# Patient Record
Sex: FEMALE | Race: WHITE | NOT HISPANIC OR LATINO | Employment: OTHER | URBAN - METROPOLITAN AREA
[De-identification: names, ages, dates, MRNs, and addresses within clinical notes are randomized per-mention and may not be internally consistent; named-entity substitution may affect disease eponyms.]

---

## 2019-01-09 ENCOUNTER — TRANSCRIBE ORDERS (OUTPATIENT)
Dept: ADMINISTRATIVE | Facility: HOSPITAL | Age: 64
End: 2019-01-09

## 2019-01-09 DIAGNOSIS — Z12.39 SCREENING BREAST EXAMINATION: Primary | ICD-10-CM

## 2019-02-04 ENCOUNTER — HOSPITAL ENCOUNTER (OUTPATIENT)
Dept: RADIOLOGY | Age: 64
Discharge: HOME | End: 2019-02-04
Payer: COMMERCIAL

## 2019-02-04 VITALS — BODY MASS INDEX: 25.71 KG/M2 | HEIGHT: 66 IN | WEIGHT: 160 LBS

## 2019-02-04 DIAGNOSIS — Z12.39 SCREENING BREAST EXAMINATION: ICD-10-CM

## 2019-02-04 PROCEDURE — 77067 SCR MAMMO BI INCL CAD: CPT

## 2019-08-12 ENCOUNTER — OFFICE VISIT (OUTPATIENT)
Dept: OBGYN CLINIC | Facility: HOSPITAL | Age: 64
End: 2019-08-12
Payer: COMMERCIAL

## 2019-08-12 VITALS
HEIGHT: 66 IN | WEIGHT: 166.6 LBS | BODY MASS INDEX: 26.78 KG/M2 | DIASTOLIC BLOOD PRESSURE: 71 MMHG | SYSTOLIC BLOOD PRESSURE: 111 MMHG | HEART RATE: 67 BPM

## 2019-08-12 DIAGNOSIS — S46.812A INFRASPINATUS TENDON TEAR, LEFT, INITIAL ENCOUNTER: ICD-10-CM

## 2019-08-12 DIAGNOSIS — M75.102 TEAR OF LEFT SUPRASPINATUS TENDON: Primary | ICD-10-CM

## 2019-08-12 PROCEDURE — 20610 DRAIN/INJ JOINT/BURSA W/O US: CPT | Performed by: ORTHOPAEDIC SURGERY

## 2019-08-12 PROCEDURE — 99203 OFFICE O/P NEW LOW 30 MIN: CPT | Performed by: ORTHOPAEDIC SURGERY

## 2019-08-12 RX ORDER — BUPIVACAINE HYDROCHLORIDE 2.5 MG/ML
2 INJECTION, SOLUTION INFILTRATION; PERINEURAL
Status: COMPLETED | OUTPATIENT
Start: 2019-08-12 | End: 2019-08-12

## 2019-08-12 RX ORDER — BETAMETHASONE SODIUM PHOSPHATE AND BETAMETHASONE ACETATE 3; 3 MG/ML; MG/ML
6 INJECTION, SUSPENSION INTRA-ARTICULAR; INTRALESIONAL; INTRAMUSCULAR; SOFT TISSUE
Status: COMPLETED | OUTPATIENT
Start: 2019-08-12 | End: 2019-08-12

## 2019-08-12 RX ORDER — PINDOLOL 5 MG/1
TABLET ORAL
COMMUNITY
Start: 2019-07-08

## 2019-08-12 RX ORDER — ATORVASTATIN CALCIUM 10 MG/1
TABLET, FILM COATED ORAL
COMMUNITY
Start: 2019-07-08

## 2019-08-12 RX ADMIN — BETAMETHASONE SODIUM PHOSPHATE AND BETAMETHASONE ACETATE 6 MG: 3; 3 INJECTION, SUSPENSION INTRA-ARTICULAR; INTRALESIONAL; INTRAMUSCULAR; SOFT TISSUE at 10:09

## 2019-08-12 RX ADMIN — BUPIVACAINE HYDROCHLORIDE 2 ML: 2.5 INJECTION, SOLUTION INFILTRATION; PERINEURAL at 10:09

## 2019-08-12 NOTE — PROGRESS NOTES
Assessment  Diagnoses and all orders for this visit:    Supraspinatus tendon, left  Comments:  retracted    Infraspinatus tendon tear, left  Comments:  chronic, retracted        Discussion and Plan:    · Explained to the patient that her MRI reveals chronic, retracted tears of the supraspinatus, infraspinatus and subscapularis with fatty muscular atrophy  Patient has had no prior treatment besides home exercise program for her left shoulder  Patient was provided today with a cortisone injection into her subacromial bursa and is documented below  She will also begin formal physical therapy for strengthening exercises due to rotator cuff deficiency  · If conservative treatments failed to improve patient's symptoms and she has continued pain that affects her daily living the ultimate treatment for her diagnosis is to perform a reverse total shoulder arthroplasty but that would only be offered if she had significant pain in addition to her limited function which she does not currently present with     · She will follow up in about 2 months for re-evaluation of left shoulder  Subjective:   Patient ID: Donny Phillips is a 58 y o  female      The patient presents with a chief complaint of left shoulder pain  The pain began 1 year(s) ago and is associated with an acute injury  Patient reports that she fell on an outstretched left arm about 1 year ago and noted immediate pain but symptoms improved until about 1 month ago when she was feeding horse and the horse quickly turned and threw her about 4 feet landing on her left side  The patient describes the pain as aching and dull in intensity,  intermittent in timing, patient reports more dysfunction of the shoulder rather than pain, and localizes the pain to the  left subacromial joint, glenohumeral joint  The pain is worse with overuse and raising arm over head and relieved by rest, ice, avoiding the painful activities    The pain is not associated with numbness and tingling  The pain is not associated with constitutional symptoms  The patient is not awoken at night by the pain  The patient has had prior treatment within last month in the form of a home exercise program with minimal benefit  The following portions of the patient's history were reviewed and updated as appropriate: allergies, current medications, past family history, past medical history, past social history, past surgical history and problem list     Review of Systems   Constitutional: Negative for chills, fever and unexpected weight change  HENT: Negative for hearing loss, nosebleeds and sore throat  Eyes: Negative for pain, redness and visual disturbance  Respiratory: Negative for cough, shortness of breath and wheezing  Cardiovascular: Negative for chest pain, palpitations and leg swelling  Gastrointestinal: Negative for abdominal distention, nausea and vomiting  Endocrine: Negative for polydipsia and polyuria  Genitourinary: Negative for dysuria and hematuria  Skin: Negative for rash and wound  Neurological: Negative for dizziness, numbness and headaches  Psychiatric/Behavioral: Negative for decreased concentration and suicidal ideas  Objective:  Left Shoulder Exam     Tenderness   The patient is experiencing no tenderness  Range of Motion   External rotation: 70   Forward flexion: 170   Internal rotation 0 degrees: Lumbar     Muscle Strength   Abduction: 4/5   External rotation: 4/5     Tests   Impingement: positive  Drop arm: positive    Other   Erythema: absent  Sensation: normal  Pulse: present               Physical Exam   Constitutional: She is oriented to person, place, and time  She appears well-developed and well-nourished  Eyes: Pupils are equal, round, and reactive to light  Pulmonary/Chest: Effort normal and breath sounds normal    Neurological: She is alert and oriented to person, place, and time  Skin: Skin is warm and dry  Psychiatric: She has a normal mood and affect  Her behavior is normal  Judgment and thought content normal      Large joint arthrocentesis: L subacromial bursa  Date/Time: 8/12/2019 10:09 AM  Consent given by: patient  Site marked: site marked  Timeout: Immediately prior to procedure a time out was called to verify the correct patient, procedure, equipment, support staff and site/side marked as required   Supporting Documentation  Indications: pain and diagnostic evaluation   Procedure Details  Location: shoulder - L subacromial bursa  Preparation: Patient was prepped and draped in the usual sterile fashion  Needle size: 22 G  Ultrasound guidance: no  Approach: lateral  Medications administered: 2 mL bupivacaine 0 25 %; 6 mg betamethasone acetate-betamethasone sodium phosphate 6 (3-3) mg/mL    Patient tolerance: patient tolerated the procedure well with no immediate complications  Dressing:  Sterile dressing applied          I have personally reviewed pertinent films in PACS and my interpretation is as follows  MRI Left Shoulder performed on 7/17/19:  Full-thickness, retracted tear of the supraspinatus and infraspinatus tendon with fatty muscle atrophy  Full-thickness tear of the mid subscapularis tendon  Extensive fluid throughout the glenohumeral joint adjacent bursa  Increasing along the proximal biceps tendon suggesting tendinopathy or partial-thickness tearing      Scribe Attestation    I,:   Luisa Valerio am acting as a scribe while in the presence of the attending physician :        I,:   Danae Dean MD personally performed the services described in this documentation    as scribed in my presence :

## 2019-08-12 NOTE — PATIENT INSTRUCTIONS

## 2019-08-22 ENCOUNTER — EVALUATION (OUTPATIENT)
Dept: PHYSICAL THERAPY | Facility: REHABILITATION | Age: 64
End: 2019-08-22
Payer: COMMERCIAL

## 2019-08-22 DIAGNOSIS — S46.812A INFRASPINATUS TENDON TEAR, LEFT, INITIAL ENCOUNTER: ICD-10-CM

## 2019-08-22 DIAGNOSIS — M75.102 TEAR OF LEFT SUPRASPINATUS TENDON: Primary | ICD-10-CM

## 2019-08-22 PROCEDURE — 97110 THERAPEUTIC EXERCISES: CPT | Performed by: PHYSICAL THERAPIST

## 2019-08-22 PROCEDURE — 97161 PT EVAL LOW COMPLEX 20 MIN: CPT | Performed by: PHYSICAL THERAPIST

## 2019-08-22 NOTE — PROGRESS NOTES
PT Evaluation     Today's date: 2019  Patient name: Pa Stanley  : 1955  MRN: [id number]  Referring provider: Saleem Ruiz MD  Dx:   Encounter Diagnosis     ICD-10-CM    1  Tear of left supraspinatus tendon M75 100 Ambulatory referral to Physical Therapy    retracted   2  Infraspinatus tendon tear, left, initial encounter S46 242L Ambulatory referral to Physical Therapy    chronic, retracted                  Assessment  Assessment details: Pt is a 58 y o  Female presenting to PT w/ referring dx of full thickness left infraspinatus tear and associated partial tear of the supraspinatus  Pt impairments include reduced ROM, pain, and reduced physical strength  Pt functional limitations include difficulty pulling, pushing, and lifting objects, difficulty performing overhead motions, and difficulty bearing weight into the left UE  Pt would benefit from skilled PT services to address the below listed impairments and functional limitations to encourage return to PLOF  Impairments: abnormal muscle firing, abnormal or restricted ROM, abnormal movement, impaired physical strength, lacks appropriate home exercise program and pain with function  Functional limitations: Difficulty performing overhead motions, putting arm behind head, lifting objects, pulling objects, bearing weight through affected extremity  Symptom irritability: moderateUnderstanding of Dx/Px/POC: good   Prognosis: good    Goals  STG: Pt will increase left shoulder abd ROM frm 70 degrees to 110 degrees in 2 weeks  STG: Pt will increase left shoulder flexion strength from 4 to 4+ in 2 weeks  STG: Pt will decrease pain from 6 at worst to 4 at worst in 2 weeks  STG: Pt will be I w/ initial HEP in 2 weeks  LTG: Pt will be able to lift and carry 20 lbs in her left hand 30'x1 in 8 weeks to demonstrate ability to complete leisurely tasks     LTG: Pt will be able to 20 lbs from floor to shoulder height 5x in 8 weeks to demonstrate ability to complete leisurely tasks  LTG: Pt will be able to overhead press 5lb x 10 with left arm in 8 weeks to demonstrate ability to complete overhead motion  LTG: Pt will be I w/ HEP at d/c     Plan  Patient would benefit from: PT eval and skilled physical therapy  Planned modality interventions: cryotherapy, electrical stimulation/Russian stimulation, H-Wave, manual electrical stimulation, TENS, microcurrent electrical stimulation, thermotherapy: hydrocollator packs, ultrasound and unattended electrical stimulation  Planned therapy interventions: flexibility, functional ROM exercises, gait training, graded exercise, home exercise program, therapeutic exercise, therapeutic activities, strengthening, stretching, neuromuscular re-education, joint mobilization, manual therapy and massage  Frequency: 2x week  Duration in visits: 20  Duration in weeks: 10  Plan of Care beginning date: 2019  Plan of Care expiration date: 10/31/2019  Treatment plan discussed with: patient        Subjective Evaluation    History of Present Illness  Date of onset: 2019  Mechanism of injury: Pt reports onset of left shoulder pain when a horse ran into her about 1 month ago  She injured her right shoulder as well but it has recovered, her left shoulder is slowly improving  States that she is able to complete motion with her left shoulder if she moves it slowly, but is unable to use it to apply a lot of pressure with, 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  She states that her goals to gain strength back in her left arm so that she is able to effectively work with her horses     Quality of life: good    Pain  Current pain ratin  At best pain ratin  At worst pain ratin  Quality: needle-like and sharp  Relieving factors: ice and heat  Aggravating factors: overhead activity and lifting  Progression: improved      Diagnostic Tests  MRI studies: abnormal    FCE comments: Full thickness infraspinatus tear, retracted, partial supraspinatus tearTreatments  Previous treatment: injection treatment  Patient Goals  Patient goals for therapy: increased motion, decreased pain, increased strength and return to sport/leisure activities          Objective     Cervical/Thoracic Screen   Cervical range of motion within normal limits    Neurological Testing     Sensation     Shoulder   Left Shoulder   Intact: light touch    Right Shoulder   Intact: light touch    Active Range of Motion   Left Shoulder   Flexion: 140 degrees   Abduction: 70 degrees   External rotation 90°: WFL    Right Shoulder   Normal active range of motion    Passive Range of Motion   Left Shoulder   Normal passive range of motion    Right Shoulder   Normal passive range of motion    Strength/Myotome Testing     Left Shoulder     Planes of Motion   Flexion: 4   Extension: 5   Abduction: 3+   External rotation at 0°: 3+   Internal rotation at 0°: 5     Right Shoulder   Normal muscle strength    Left Elbow   Normal strength    Right Elbow   Normal strength    Tests     Left Shoulder   Positive empty can, full can and painful arc  Negative drop arm and Hawkin's  Right Shoulder   Negative drop arm, empty can, full can, Hawkin's and painful arc                Precautions: Supraspinatus tear, full thickness infraspinatus       Manual  8/22                                                                                 Exercise Diary  8/22            Shoulder iso flex/abd/ER 5x10"            Table slide abd x20            Wall W Single arm flex/abd, 2 arm horiz add            Pulleys NV            Cane abd/flex/er NV            Serratus slides on table w/ foam roller NV                                                                                                                                                                                                      Modalities  8/22

## 2019-08-26 ENCOUNTER — OFFICE VISIT (OUTPATIENT)
Dept: PHYSICAL THERAPY | Facility: REHABILITATION | Age: 64
End: 2019-08-26
Payer: COMMERCIAL

## 2019-08-26 DIAGNOSIS — M75.102 TEAR OF LEFT SUPRASPINATUS TENDON: Primary | ICD-10-CM

## 2019-08-26 DIAGNOSIS — S46.812A INFRASPINATUS TENDON TEAR, LEFT, INITIAL ENCOUNTER: ICD-10-CM

## 2019-08-26 PROCEDURE — 97110 THERAPEUTIC EXERCISES: CPT

## 2019-08-26 PROCEDURE — 97112 NEUROMUSCULAR REEDUCATION: CPT

## 2019-08-26 NOTE — PROGRESS NOTES
Daily Note     Today's date: 2019  Patient name: Brenna Gilliam  : 1955  MRN: [id number]  Referring provider: Lexus Tam MD  Dx:   Encounter Diagnosis     ICD-10-CM    1  Tear of left supraspinatus tendon M75 100    2  Infraspinatus tendon tear, left, initial encounter S46 812A        Start Time: 1200  Stop Time: 1250  Total time in clinic (min): 50 minutes    Subjective: Pt reports difficulty with OH motions, "it hurts with weight bearing"  No change since IE  Objective: See treatment diary below      Assessment: Tolerated treatment well  No pain with pulleys this session  Difficulty with ABD isometrics, no increase in pain  Progressed AAROM to good tolerance  Increased flexion AAROM in supine than standing  Patient demonstrated fatigue post treatment      Plan: Continue per plan of care  Assess DOMS response        Precautions: Supraspinatus tear, full thickness infraspinatus       Manual                                                                                  Exercise Diary             Shoulder iso flex/abd/ER 5x10" 10x10"           Table slide abd x20 10x           Wall W Single arm flex/abd, 2 arm horiz add  wall slides flexion and scaption 2x10            Pulleys NV 4'            Cane abd/flex/er NV standing abd 20x  Flex 10x     supine flexion 10x           Serratus slides on table w/ foam roller NV 20x                                                                                                                                                                                                     Modalities

## 2019-08-30 ENCOUNTER — OFFICE VISIT (OUTPATIENT)
Dept: PHYSICAL THERAPY | Facility: REHABILITATION | Age: 64
End: 2019-08-30
Payer: COMMERCIAL

## 2019-08-30 DIAGNOSIS — S46.812A INFRASPINATUS TENDON TEAR, LEFT, INITIAL ENCOUNTER: ICD-10-CM

## 2019-08-30 DIAGNOSIS — M75.102 TEAR OF LEFT SUPRASPINATUS TENDON: Primary | ICD-10-CM

## 2019-08-30 PROCEDURE — 97112 NEUROMUSCULAR REEDUCATION: CPT | Performed by: PHYSICAL THERAPIST

## 2019-08-30 PROCEDURE — 97110 THERAPEUTIC EXERCISES: CPT | Performed by: PHYSICAL THERAPIST

## 2019-08-30 NOTE — PROGRESS NOTES
Daily Note     Today's date: 2019  Patient name: Key Ott  : 1955  MRN: [id number]  Referring provider: Dre Jacobson MD  Dx:   Encounter Diagnosis     ICD-10-CM    1  Tear of left supraspinatus tendon M75 100    2  Infraspinatus tendon tear, left, initial encounter S46 812A        Start Time: 8572  Stop Time: 1500  Total time in clinic (min): 40 minutes    Subjective: Pt reports that her pain has been getting better, depending how she movers her arm she can fully lift overhead  She reports that she is very active at home, will do a lot of gardening, horse caring  Objective: See treatment diary below      Assessment: Tolerated treatment well  Able to perform flexion and ABD without pain, no catch  Weakness remains with ER at side but able to perform some resistance at 90 ABD (teres minor)  Plan: Continue per plan of care  Assess DOMS response        Precautions: Supraspinatus tear, full thickness infraspinatus       Manual                                                                                 Exercise Diary            Shoulder iso flex/abd/ER/IR 5x10" 10x10" 10"x10          Table slide abd x20 10x           Wall W Single arm flex/abd, 2 arm horiz add  wall slides flexion and scaption 2x10  Wall slides flexion and scaption 2x10          Pulleys NV 4'  5'          Cane abd/flex/er NV standing abd 20x  Flex 10x     supine flexion 10x           Serratus slides on table w/ foam roller NV 20x           Low row   OTB  2x10  5"                                                                                                                                                                                       Modalities

## 2019-09-05 ENCOUNTER — OFFICE VISIT (OUTPATIENT)
Dept: PHYSICAL THERAPY | Facility: REHABILITATION | Age: 64
End: 2019-09-05
Payer: COMMERCIAL

## 2019-09-05 DIAGNOSIS — M75.102 TEAR OF LEFT SUPRASPINATUS TENDON: Primary | ICD-10-CM

## 2019-09-05 DIAGNOSIS — S46.812A INFRASPINATUS TENDON TEAR, LEFT, INITIAL ENCOUNTER: ICD-10-CM

## 2019-09-05 PROCEDURE — 97112 NEUROMUSCULAR REEDUCATION: CPT | Performed by: PHYSICAL THERAPIST

## 2019-09-05 PROCEDURE — 97110 THERAPEUTIC EXERCISES: CPT | Performed by: PHYSICAL THERAPIST

## 2019-09-05 NOTE — PROGRESS NOTES
Daily Note     Today's date: 2019  Patient name: Hao Amato  : 1955  MRN: [id number]  Referring provider: Naeem Maria MD  Dx:   Encounter Diagnosis     ICD-10-CM    1  Tear of left supraspinatus tendon M75 100    2  Infraspinatus tendon tear, left, initial encounter S46 812A        Start Time: 0800  Stop Time: 0845  Total time in clinic (min): 45 minutes    Subjective: Pt reports that she may have over exerted with vacuuming and cleaning the there day  She also did roto tilling which she thinks may have increased soreness  Objective: See treatment diary below      Assessment: Patient provided with YTB for ER walk outs  She was initially unable to hold ER with initial set but 2/3rd sets she held well with c/o fatigue  Patient with improved GHJ/STJ mechanics post wall slides  Plan: Continue per plan of care  Assess DOMS response        Precautions: Supraspinatus tear, full thickness infraspinatus       Manual                                                                                 Exercise Diary           ER walk outs 5x10" 10x10" 10"x10 5"  3x8  YTB         Table slide abd x20 10x           Wall W Single arm flex/abd, 2 arm horiz add  wall slides flexion and scaption 2x10  Wall slides flexion and scaption 2x10 Wall slides flexion and scaption 2x10         Pulleys NV 4'  5' 5'         Cane abd/flex/er NV standing abd 20x  Flex 10x     supine flexion 10x           Serratus slides on table w/ foam roller NV 20x           Low row   OTB  2x10  5" OTB  3x10         UBE    5'         Weight shift at wall    15x                                                                                                                                                            Modalities

## 2019-09-09 ENCOUNTER — OFFICE VISIT (OUTPATIENT)
Dept: PHYSICAL THERAPY | Facility: REHABILITATION | Age: 64
End: 2019-09-09
Payer: COMMERCIAL

## 2019-09-09 DIAGNOSIS — M75.102 TEAR OF LEFT SUPRASPINATUS TENDON: Primary | ICD-10-CM

## 2019-09-09 DIAGNOSIS — S46.812A INFRASPINATUS TENDON TEAR, LEFT, INITIAL ENCOUNTER: ICD-10-CM

## 2019-09-09 PROCEDURE — 97110 THERAPEUTIC EXERCISES: CPT | Performed by: PHYSICAL THERAPIST

## 2019-09-09 PROCEDURE — 97112 NEUROMUSCULAR REEDUCATION: CPT | Performed by: PHYSICAL THERAPIST

## 2019-09-09 NOTE — PROGRESS NOTES
Daily Note     Today's date: 2019  Patient name: Isac Hollis  : 1955  MRN: [id number]  Referring provider: Misha Blum MD  Dx:   Encounter Diagnosis     ICD-10-CM    1  Tear of left supraspinatus tendon M75 100    2  Infraspinatus tendon tear, left, initial encounter S46 812A        Start Time: 1200  Stop Time: 1240  Total time in clinic (min): 40 minutes    Subjective: Pt reports that reaching with ADLs that includes for seat belt, behind back has become easier  Still with a catch at times depending on how she moves her arm  Objective: See treatment diary below      Assessment: Fatigue with TB walk outs as well as low rows  Cues needed with low rows to properly engage lower trap and scapular retraction vs GHJ extension/ant glide  Catch remains depending how she reaches into flexion or ABD  Reaches functionally overhead with 2 hands -roughly 2-3# but pain reported lateral shoulder  Plan: Continue per plan of care  Assess DOMS response        Precautions: Supraspinatus tear, full thickness infraspinatus       Manual                                                                                 Exercise Diary          ER walk outs 5x10" 10x10" 10"x10 5"  3x8  YTB 5"  2x15  YTB        Table slide abd x20 10x           Wall W Single arm flex/abd, 2 arm horiz add  wall slides flexion and scaption 2x10  Wall slides flexion and scaption 2x10 Wall slides flexion and scaption 2x10 Wall slides flexion and scaption 2x10        Pulleys NV 4'  5' 5' 5'        Cane abd/flex/er NV standing abd 20x  Flex 10x     supine flexion 10x           Serratus slides on table w/ foam roller NV 20x           Low row   OTB  2x10  5" OTB  3x10 OTB  3x10        UBE    5' 5'        Weight shift at wall    15x 3x5        Functional reach overhead (2 hands) 2nd cabinet shelf     #1 ball  2x5 Modalities  8/22

## 2019-09-12 ENCOUNTER — OFFICE VISIT (OUTPATIENT)
Dept: PHYSICAL THERAPY | Facility: REHABILITATION | Age: 64
End: 2019-09-12
Payer: COMMERCIAL

## 2019-09-12 DIAGNOSIS — S46.812A INFRASPINATUS TENDON TEAR, LEFT, INITIAL ENCOUNTER: ICD-10-CM

## 2019-09-12 DIAGNOSIS — M75.102 TEAR OF LEFT SUPRASPINATUS TENDON: Primary | ICD-10-CM

## 2019-09-12 PROCEDURE — 97110 THERAPEUTIC EXERCISES: CPT | Performed by: PHYSICAL THERAPIST

## 2019-09-12 PROCEDURE — 97112 NEUROMUSCULAR REEDUCATION: CPT | Performed by: PHYSICAL THERAPIST

## 2019-09-12 NOTE — PROGRESS NOTES
Daily Note     Today's date: 2019  Patient name: Farrukh Gurrola  : 1955  MRN: [id number]  Referring provider: Quynh León MD  Dx:   Encounter Diagnosis     ICD-10-CM    1  Tear of left supraspinatus tendon M75 100    2  Infraspinatus tendon tear, left, initial encounter S46 812A        Start Time: 0800  Stop Time: 0850  Total time in clinic (min): 50 minutes    Subjective: Pt reports the shoulder is feeling ok today  Objective: See treatment diary below      Assessment: Pt is challenged by band ER walk outs and fatigues quickly  Cues given during rows to retract the shoulders more to activate the mid trap  Pt fatigued with 1# ball lifts, but was able to perform an extra set of 5 today  Plan: Continue per plan of care  Assess DOMS response        Precautions: Supraspinatus tear, full thickness infraspinatus       Manual                                                                              Exercise Diary         ER walk outs 5x10" 10x10" 10"x10 5"  3x8  YTB 5"  2x15  YTB 5"  2x15  YTB       Table slide abd x20 10x           Wall W Single arm flex/abd, 2 arm horiz add  wall slides flexion and scaption 2x10  Wall slides flexion and scaption 2x10 Wall slides flexion and scaption 2x10 Wall slides flexion and scaption 2x10 Wall slides flexion and scaption 2x10       Pulleys NV 4'  5' 5' 5' 5'       Cane abd/flex/er NV standing abd 20x  Flex 10x     supine flexion 10x           Serratus slides on table w/ foam roller NV 20x           Low row   OTB  2x10  5" OTB  3x10 OTB  3x10 OTB  3x10       UBE    5' 5' 5'       Weight shift at wall    15x 3x5        Functional reach overhead (2 hands) 2nd cabinet shelf     #1 ball  2x5 #1 ball  3x5                                                                                                                                             Modalities

## 2019-09-16 ENCOUNTER — OFFICE VISIT (OUTPATIENT)
Dept: PHYSICAL THERAPY | Facility: REHABILITATION | Age: 64
End: 2019-09-16
Payer: COMMERCIAL

## 2019-09-16 DIAGNOSIS — S46.812A INFRASPINATUS TENDON TEAR, LEFT, INITIAL ENCOUNTER: ICD-10-CM

## 2019-09-16 DIAGNOSIS — M75.102 TEAR OF LEFT SUPRASPINATUS TENDON: Primary | ICD-10-CM

## 2019-09-16 PROCEDURE — 97112 NEUROMUSCULAR REEDUCATION: CPT

## 2019-09-16 PROCEDURE — 97110 THERAPEUTIC EXERCISES: CPT

## 2019-09-16 NOTE — PROGRESS NOTES
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  Objective: See treatment diary below      Assessment: Pt tolerated session well, tends to compensate with lumbar extension during TB rows, corrects with VC  Good LT activation during scaption wall lifts  Remains challenged with ER walk outs  Plan: Continue per plan of care         Precautions: Supraspinatus tear, full thickness infraspinatus       Manual                                                                             Exercise Diary        ER walk outs 5x10" 10x10" 10"x10 5"  3x8  YTB 5"  2x15  YTB 5"  2x15  YTB 5"  2x15  OTB      Table slide abd x20 10x           Wall W Single arm flex/abd, 2 arm horiz add  wall slides flexion and scaption 2x10  Wall slides flexion and scaption 2x10 Wall slides flexion and scaption 2x10 Wall slides flexion and scaption 2x10 Wall slides flexion and scaption 2x10 2x10       Pulleys NV 4'  5' 5' 5' 5' 5'      Cane abd/flex/er NV standing abd 20x  Flex 10x     supine flexion 10x           Serratus slides on table w/ foam roller NV 20x           Low row   OTB  2x10  5" OTB  3x10 OTB  3x10 OTB  3x10 OTB  3x10       UBE    5' 5' 5' 5'       Weight shift at wall    15x 3x5        Functional reach overhead (2 hands) 2nd cabinet shelf     #1 ball  2x5 #1 ball  3x5 1# ball   3x5                                                                                                                                                Modalities 8/22

## 2019-09-20 ENCOUNTER — OFFICE VISIT (OUTPATIENT)
Dept: PHYSICAL THERAPY | Facility: REHABILITATION | Age: 64
End: 2019-09-20
Payer: COMMERCIAL

## 2019-09-20 DIAGNOSIS — M75.102 TEAR OF LEFT SUPRASPINATUS TENDON: Primary | ICD-10-CM

## 2019-09-20 DIAGNOSIS — S46.812A INFRASPINATUS TENDON TEAR, LEFT, INITIAL ENCOUNTER: ICD-10-CM

## 2019-09-20 PROCEDURE — 97112 NEUROMUSCULAR REEDUCATION: CPT | Performed by: PHYSICAL THERAPIST

## 2019-09-20 NOTE — PROGRESS NOTES
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  Objective: See treatment diary below      Assessment: Patient demonstrates no arc sign with flexion or ABD of shoulder  Fatigue continues with ER strengthening, unable to progress with GTB  She was however able to improve to reaching with 1 arm overhead using 1# ball  Plan: Continue per plan of care         Precautions: Supraspinatus tear, full thickness infraspinatus       Manual                                                                            Exercise Diary       ER walk outs 5x10" 10x10" 10"x10 5"  3x8  YTB 5"  2x15  YTB 5"  2x15  YTB 5"  2x15  OTB 5"  2x15  OTB     Table slide abd x20 10x           Wall W Single arm flex/abd, 2 arm horiz add  wall slides flexion and scaption 2x10  Wall slides flexion and scaption 2x10 Wall slides flexion and scaption 2x10 Wall slides flexion and scaption 2x10 Wall slides flexion and scaption 2x10 2x10  10x  10x 2#     Pulleys NV 4'  5' 5' 5' 5' 5' NP     Cane abd/flex/er NV standing abd 20x  Flex 10x     supine flexion 10x           Serratus slides on table w/ foam roller NV 20x           Low row   OTB  2x10  5" OTB  3x10 OTB  3x10 OTB  3x10 OTB  3x10  GTB  3x10     UBE    5' 5' 5' 5'  6'     Weight shift at wall    15x 3x5        Functional reach overhead (1 hand) 2nd cabinet shelf     #1 ball  2x5 #1 ball  3x5 1# ball   3x5     1# ball   3x5     Diogenes Blake carry 10/15#  50ft x2  3 sets                                                                                                                              Modalities  8/22

## 2019-09-23 ENCOUNTER — OFFICE VISIT (OUTPATIENT)
Dept: PHYSICAL THERAPY | Facility: REHABILITATION | Age: 64
End: 2019-09-23
Payer: COMMERCIAL

## 2019-09-23 DIAGNOSIS — S46.812A INFRASPINATUS TENDON TEAR, LEFT, INITIAL ENCOUNTER: ICD-10-CM

## 2019-09-23 DIAGNOSIS — M75.102 TEAR OF LEFT SUPRASPINATUS TENDON: Primary | ICD-10-CM

## 2019-09-23 PROCEDURE — 97112 NEUROMUSCULAR REEDUCATION: CPT

## 2019-09-23 PROCEDURE — 97110 THERAPEUTIC EXERCISES: CPT

## 2019-09-23 NOTE — PROGRESS NOTES
Daily Note     Today's date: 2019  Patient name: Yris Cuellar  : 1955  MRN: [id number]  Referring provider: Richi Jones MD  Dx:   Encounter Diagnosis     ICD-10-CM    1  Tear of left supraspinatus tendon M75 100    2  Infraspinatus tendon tear, left, initial encounter O81 300W        Start Time: 1145  Stop Time: 1235  Total time in clinic (min): 50 minutes    Subjective: Pt reports that she is starting to notice improvements in strength  Still has difficulty with OH motions  Objective: See treatment diary below      Assessment: Patient tolerated session welll  Unable to use 2# weight this session OH without compensation, full range with 1#  Continues to be challenged with ER walkouts  Plan: Continue per plan of care         Precautions: Supraspinatus tear, full thickness infraspinatus       Manual                                                                           Exercise Diary      ER walk outs 5x10" 10x10" 10"x10 5"  3x8  YTB 5"  2x15  YTB 5"  2x15  YTB 5"  2x15  OTB 5"  2x15  OTB 5"  2x15  OTB    Table slide abd x20 10x           Wall W Single arm flex/abd, 2 arm horiz add  wall slides flexion and scaption 2x10  Wall slides flexion and scaption 2x10 Wall slides flexion and scaption 2x10 Wall slides flexion and scaption 2x10 Wall slides flexion and scaption 2x10 2x10  10x  10x 2# 1# Y's  10x    1# W's    10x    Pulleys NV 4'  5' 5' 5' 5' 5' NP     Cane abd/flex/er NV standing abd 20x  Flex 10x     supine flexion 10x           Serratus slides on table w/ foam roller NV 20x           Low row   OTB  2x10  5" OTB  3x10 OTB  3x10 OTB  3x10 OTB  3x10  GTB  3x10 GTB  3x10    UBE    5' 5' 5' 5'  6' 3'/3'    Weight shift at wall    15x 3x5        Functional reach overhead (1 hand) 2nd cabinet shelf     #1 ball  2x5 #1 ball  3x5 1# ball   3x5     1# ball   3x5 1#  Ball  3x5    Farmer carry 10/15#  50ft x2  3 sets 10/15#  50ft x2  3 sets                                                                                                                             Modalities  8/22

## 2019-09-26 ENCOUNTER — OFFICE VISIT (OUTPATIENT)
Dept: PHYSICAL THERAPY | Facility: REHABILITATION | Age: 64
End: 2019-09-26
Payer: COMMERCIAL

## 2019-09-26 DIAGNOSIS — S46.812A INFRASPINATUS TENDON TEAR, LEFT, INITIAL ENCOUNTER: ICD-10-CM

## 2019-09-26 DIAGNOSIS — M75.102 TEAR OF LEFT SUPRASPINATUS TENDON: Primary | ICD-10-CM

## 2019-09-26 PROCEDURE — 97140 MANUAL THERAPY 1/> REGIONS: CPT | Performed by: PHYSICAL THERAPIST

## 2019-09-26 PROCEDURE — 97112 NEUROMUSCULAR REEDUCATION: CPT | Performed by: PHYSICAL THERAPIST

## 2019-09-26 NOTE — PROGRESS NOTES
Daily Note and Discharge    Today's date: 2019  Patient name: Fozia Emerson  : 1955  MRN: [id number]  Referring provider: Ngozi Velez MD  Dx:   Encounter Diagnosis     ICD-10-CM    1  Tear of left supraspinatus tendon M75 100    2  Infraspinatus tendon tear, left, initial encounter S46 812A        Start Time: 1130  Stop Time: 1215  Total time in clinic (min): 45 minutes    Subjective:  Pt  Reports that she has found benefit from her PT sessions as she was able to improve upon he range and strength deficits as she is getting fewer and fewer catches in her shoulder with flexion  Objective: See treatment diary below    Shoulder AROM WFL  ER lag negative  ER MMT 3/5  Flexion MMT 3+/5  + full can    Assessment:  Patient is a 58y o  year old female who attended physical therapy for 11 treatment sessions regarding left shoulder pain with underlying supraspinatus and infraspinatus tears  Patient reports improvement at this time which correlates to improved impairments and functionality  Patient has shown improvement throughout PT by demonstrating decreased pain, increased range of motion, increased strength and improved tolerance to activity  Patient continues to present with strength deficits in all planes but she functionally has been performing ALDs that does include tending to a caring for her horses  Plan: Continue per plan of care         Precautions: Supraspinatus tear, full thickness infraspinatus       Manual     Re-assessment          10'                                                           Exercise Diary   9 9   ER walk outs 5x10" 10x10" 10"x10 5"  3x8  YTB 5"  2x15  YTB 5"  2x15  YTB 5"  2x15  OTB 5"  2x15  OTB 5"  2x15  OTB 5"  2x15 OTB   Table slide abd x20 10x           Wall W Single arm flex/abd, 2 arm horiz add  wall slides flexion and scaption 2x10  Wall slides flexion and scaption 2x10 Wall slides flexion and scaption 2x10 Wall slides flexion and scaption 2x10 Wall slides flexion and scaption 2x10 2x10  10x  10x 2# 1# Y's  10x    1# W's    10x    Pulleys NV 4'  5' 5' 5' 5' 5' NP     Cane abd/flex/er NV standing abd 20x  Flex 10x     supine flexion 10x           Serratus slides on table w/ foam roller NV 20x           Low row   OTB  2x10  5" OTB  3x10 OTB  3x10 OTB  3x10 OTB  3x10  GTB  3x10 GTB  3x10 GTB  3x10   UBE    5' 5' 5' 5'  6' 3'/3' 3/3'   Weight shift at wall    15x 3x5        Functional reach overhead (1 hand) 2nd cabinet shelf     #1 ball  2x5 #1 ball  3x5 1# ball   3x5     1# ball   3x5 1#  Ball  3x5    Dumotn carry        10/15#  50ft x2  3 sets 10/15#  50ft x2  3 sets    HEP education          10'                                                                                                               Modalities  8/22

## 2019-09-27 ENCOUNTER — APPOINTMENT (OUTPATIENT)
Dept: PHYSICAL THERAPY | Facility: REHABILITATION | Age: 64
End: 2019-09-27
Payer: COMMERCIAL

## 2019-11-04 ENCOUNTER — OFFICE VISIT (OUTPATIENT)
Dept: OBGYN CLINIC | Facility: HOSPITAL | Age: 64
End: 2019-11-04
Payer: MEDICARE

## 2019-11-04 VITALS
DIASTOLIC BLOOD PRESSURE: 77 MMHG | WEIGHT: 168 LBS | SYSTOLIC BLOOD PRESSURE: 124 MMHG | HEART RATE: 65 BPM | BODY MASS INDEX: 27 KG/M2 | HEIGHT: 66 IN

## 2019-11-04 DIAGNOSIS — S46.812A INFRASPINATUS TENDON TEAR, LEFT, INITIAL ENCOUNTER: ICD-10-CM

## 2019-11-04 DIAGNOSIS — M75.102 TEAR OF LEFT SUPRASPINATUS TENDON: Primary | ICD-10-CM

## 2019-11-04 PROCEDURE — 99213 OFFICE O/P EST LOW 20 MIN: CPT | Performed by: ORTHOPAEDIC SURGERY

## 2019-11-04 NOTE — PROGRESS NOTES
Assessment  Diagnoses and all orders for this visit:    Tear of left supraspinatus tendon  Comments:  Chronic, Retracted    Infraspinatus tendon tear, left, initial encounter  Comments:  Chronic, retracted        Discussion and Plan:    · Patient has made improvements in her symptoms/strength since her last visit  Patient has been performing formal physical therapy/HEP with appreciable benefit  At this time there is no further treatment that needs to be rendered  She was instructed to continue with the HEP as tolerated  If symptoms worsen patient was instructed to call office for possible repeat cortisone injection  The surgical treatment for the above-mentioned diagnosis would be in the form of a reverse total shoulder arthroplasty but that would only be offered if he has significant pain in addition to her limited function which she does not currently have  · She will follow up on an as-needed basis  Subjective:   Patient ID: Merlin Saeed is a 58 y o  female      HPI  68-year-old female presents to the office today for follow-up visit for her left shoulder  Patient has been treated conservatively for a 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 with acute exacerbation  Patient states that she has been performing formal physical therapy and supplementing with home exercise program as instructed  She also received a cortisone injection on 08/12/2019  She reports that these treatments have been successful in treating her pain/symptoms  She still notes very mild, intermittent pain about the anterior lateral aspect of the left shoulder but states that it does not hinder her daily activities  Overall, she is happy with her progress and wishes to continue to avoid a surgical intervention  Denies numbness and tingling, fever, chills        The following portions of the patient's history were reviewed and updated as appropriate: allergies, current medications, past family history, past medical history, past social history, past surgical history and problem list     Review of Systems   Constitutional: Negative for chills, fever and unexpected weight change  HENT: Negative for hearing loss, nosebleeds and sore throat  Eyes: Negative for pain, redness and visual disturbance  Respiratory: Negative for cough, shortness of breath and wheezing  Cardiovascular: Negative for chest pain, palpitations and leg swelling  Gastrointestinal: Negative for abdominal distention, nausea and vomiting  Endocrine: Negative for polydipsia and polyuria  Genitourinary: Negative for dysuria and hematuria  Skin: Negative for rash and wound  Neurological: Negative for dizziness, numbness and headaches  Psychiatric/Behavioral: Negative for decreased concentration and suicidal ideas  Objective:  /77   Pulse 65   Ht 5' 6" (1 676 m)   Wt 76 2 kg (168 lb)   BMI 27 12 kg/m²       Left Shoulder Exam     Tenderness   The patient is experiencing no tenderness  Range of Motion   The patient has normal left shoulder ROM  Muscle Strength   Abduction: 5/5   External rotation: 3/5     Other   Erythema: absent  Sensation: normal  Pulse: present             Physical Exam   Constitutional: She is oriented to person, place, and time  She appears well-developed and well-nourished  Eyes: Pupils are equal, round, and reactive to light  Pulmonary/Chest: Effort normal and breath sounds normal    Neurological: She is alert and oriented to person, place, and time  Skin: Skin is warm and dry  Psychiatric: She has a normal mood and affect   Her behavior is normal  Judgment and thought content normal          Scribe Attestation    I,:   Jamal Gaona am acting as a scribe while in the presence of the attending physician :        I,:   Trino Juarez MD personally performed the services described in this documentation    as scribed in my presence :

## 2019-12-11 ENCOUNTER — EVALUATION (OUTPATIENT)
Dept: PHYSICAL THERAPY | Facility: REHABILITATION | Age: 64
End: 2019-12-11
Payer: MEDICARE

## 2019-12-11 DIAGNOSIS — M54.16 LUMBAR RADICULOPATHY, RIGHT: Primary | ICD-10-CM

## 2019-12-11 PROCEDURE — 97162 PT EVAL MOD COMPLEX 30 MIN: CPT | Performed by: PHYSICAL THERAPIST

## 2019-12-11 PROCEDURE — 97110 THERAPEUTIC EXERCISES: CPT | Performed by: PHYSICAL THERAPIST

## 2019-12-11 NOTE — PROGRESS NOTES
PT Evaluation     Today's date: 2019  Patient name: Nicole Hardwick  : 1955  MRN: [id number]  Referring provider: Marisel Dupree MD  Dx:   Encounter Diagnosis     ICD-10-CM    1  Lumbar radiculopathy, right M54 16                   Assessment  Assessment details: Anita Beal is a 71 y o  Female presenting to PT w/ a 2 month history of LBP and right LE radicular symptoms including pain, numbness, and tingling  These symptoms are of insidious onset  Her signs and symptoms and clinical exam findings are consistent with those of lumbar spine derangement, initially presenting with a flexion directional preference  She presents with impairments such as reduced ROM, reduced strength, and pain  These impairments are leading to the below listed functional limitations  Pt would benefit from skilled PT services to address the below listed impairments and functional limitations to encourage return to PLOF  Impairments: abnormal gait, abnormal or restricted ROM, abnormal movement, activity intolerance, impaired physical strength, lacks appropriate home exercise program and pain with function  Functional limitations: Difficulty walking, standing, lifting/carrying/pushing/pulling objects  Symptom irritability: moderateUnderstanding of Dx/Px/POC: good   Prognosis: good    Goals  STG: Pt will increase lumbar ROM by 25% in all deficient planes in 2-4 weeks  STG: Pt will increase RLE MMT grades by 1/2 MMT grade in all deficient planes in 2-4 weeks  STG: Pt will be I w/ initial HEP in 1 week  LTG: Pt will be able to walk 10' w/ <2/10 RLE pain by d/c to demonstrate return to PLOF  LTG: Pt will be able to lift and carry 25 lbs x 20' x 2 by d/c to demonstrate return to PLOF     LTG: Pt will be I w/ HEP at d/c      Plan  Patient would benefit from: PT eval and skilled physical therapy  Planned modality interventions: TENS, thermotherapy: hydrocollator packs, cryotherapy and low level laser therapy  Planned therapy interventions: joint mobilization, manual therapy, massage, strengthening, stretching, therapeutic activities, therapeutic exercise, neuromuscular re-education, flexibility, functional ROM exercises, graded exercise and graded motor  Frequency: 1x week  Duration in visits: 10  Duration in weeks: 10  Plan of Care beginning date: 2019  Plan of Care expiration date: 2020  Treatment plan discussed with: patient        Subjective Evaluation    History of Present Illness  Mechanism of injury: Reports onset of anterior thigh soreness and pain beginning in October  She then began to experience numbness and tingling widespread into her RLE and lateral hip beginning in November  She states that she is able to lay on her back without pain but has the most difficulty with standing, walking, or navigating stairs for long periods of time  If she stands for too long she will experience muscle spasms and gradual onset of RLE tingling and pain  States that these symptoms are on and off, has good days and bad days  States that she does not get lower back symptoms too often  She has been prescribed tramadol and a muscle relaxer and has taken them with minor relief  States that her goals are to reduce pain so that she is able to do work around the house      Pain  Current pain ratin  At best pain ratin  At worst pain rating: 10  Quality: throbbing, sharp, radiating, needle-like, knife-like, dull ache and cramping  Aggravating factors: standing, walking, stair climbing and lifting      Diagnostic Tests  X-ray: abnormal    FCE comments: Age related degenerative changesTreatments  Current treatment: medication  Patient Goals  Patient goals for therapy: decreased pain, increased motion, independence with ADLs/IADLs and return to sport/leisure activities          Objective     Concurrent Complaints  Negative for night pain, disturbed sleep, bladder dysfunction, bowel dysfunction and saddle (S4) numbness    Neurological Testing     Sensation     Lumbar   Left   Intact: light touch    Right   Intact: light touch    Reflexes   Left   Patellar (L4): normal (2+)  Achilles (S1): normal (2+)  Clonus sign: negative    Right   Patellar (L4): normal (2+)  Achilles (S1): normal (2+)  Clonus sign: negative    Active Range of Motion     Lumbar   Flexion:  Restriction level: minimal  Extension:  Restriction level: maximal  Left lateral flexion:  Restriction level: moderate  Right lateral flexion:  Restriction level: moderate  Left rotation:  Restriction level: moderate  Right rotation:  Restriction level: moderate  Mechanical Assessment    Cervical      Thoracic      Lumbar    Standing flexion: repeated movements   Pain location:centralized  Sitting flexion: repeated movements  Standing extension: repeated movements  Pain location: peripheralized  Pain intensity: worse  Pain level: increased    Strength/Myotome Testing     Lumbar   Left   Normal strength  Heel walk: normal  Toe walk: normal    Right   Heel walk: normal  Toe walk: normal    Right Hip   Planes of Motion   Flexion: 4    Right Knee   Flexion: 4  Extension: 5    Right Ankle/Foot   Dorsiflexion: 4  Great toe extension: 4+    Tests     Lumbar     Left   Negative crossed SLR and passive SLR  Right   Positive passive SLR  Negative crossed SLR  Left Hip   Negative BAHMAN and FADIR       Ambulation     Comments   Walks slowly due to pain, ambulates w/ unilateral crutch in left arm             Precautions:       Manual  12/11                                                                                 Exercise Diary  12/11            RFISit HEP            SKTC/DKTC HEP            Sciatic nerve glides R HEP            3-way p ball             Seated QL stretch                                                                                                                                                                                                                    Modalities

## 2019-12-18 ENCOUNTER — OFFICE VISIT (OUTPATIENT)
Dept: PHYSICAL THERAPY | Facility: REHABILITATION | Age: 64
End: 2019-12-18
Payer: MEDICARE

## 2019-12-18 DIAGNOSIS — M54.16 LUMBAR RADICULOPATHY, RIGHT: Primary | ICD-10-CM

## 2019-12-18 PROCEDURE — 97530 THERAPEUTIC ACTIVITIES: CPT | Performed by: PHYSICAL THERAPIST

## 2019-12-18 PROCEDURE — 97110 THERAPEUTIC EXERCISES: CPT | Performed by: PHYSICAL THERAPIST

## 2019-12-18 PROCEDURE — 97112 NEUROMUSCULAR REEDUCATION: CPT | Performed by: PHYSICAL THERAPIST
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
## 2019-12-23 ENCOUNTER — OFFICE VISIT (OUTPATIENT)
Dept: PHYSICAL THERAPY | Facility: REHABILITATION | Age: 64
End: 2019-12-23
Payer: MEDICARE

## 2019-12-23 DIAGNOSIS — M54.16 LUMBAR RADICULOPATHY, RIGHT: Primary | ICD-10-CM

## 2019-12-23 PROCEDURE — 97530 THERAPEUTIC ACTIVITIES: CPT | Performed by: PHYSICAL THERAPIST

## 2019-12-23 PROCEDURE — 97140 MANUAL THERAPY 1/> REGIONS: CPT | Performed by: PHYSICAL THERAPIST

## 2019-12-23 PROCEDURE — 97110 THERAPEUTIC EXERCISES: CPT | Performed by: PHYSICAL THERAPIST

## 2019-12-23 NOTE — PROGRESS NOTES
Daily Note     Today's date: 2019  Patient name: Mahad Zhang  : 1955  MRN: [id number]  Referring provider: Lore Kehr, MD  Dx:   Encounter Diagnosis     ICD-10-CM    1  Lumbar radiculopathy, right M54 16                   Subjective: States that she feels like her lower back and radicular symptoms have been improving, but that her ankle has been bothering her a lot, pain w/ weightbearing posteriorly and laterally  Has the most issues with weightbearing and walking  Objective: See treatment diary below     Talar tilt ATFL (+)  Talar tilt CFL (+)  Anterior drawer (+)  MMT: R EV/IV: 3-      Assessment: She does seem to have some signs of lateral ankle sprain, possible peroneus tendon involvement as a result of her initial injury  Today she has the most problems w/ bearing weight through the right leg  Will continue lower back mobility program and begin core strengthening as this has yielded good results  Educated on load progression regarding her right ankle  She would benefit from continued PT services to reduce pain and increase level of function  Plan: Continue per plan of care        Precautions:       Manual            Education  15', MM MM, 10'          Ankle PROM                                                        Exercise Diary            RFISit HEP Reviewed, x20           SKTC/DKTC HEP Reviewed           Sciatic nerve harsha R HEP Reviewed           3-way p ball  10x5" ea           Seated QL stretch  10x5"           Prayer stretch  5x10"           Cat/cow  x10           LTR w/ UE  10x5" ea 10x5" ea          RENA   3'          TA sets   10x10"                                                                                        VG   25% 5'                                                     Modalities

## 2020-01-02 ENCOUNTER — OFFICE VISIT (OUTPATIENT)
Dept: PHYSICAL THERAPY | Facility: REHABILITATION | Age: 65
End: 2020-01-02
Payer: MEDICARE

## 2020-01-02 DIAGNOSIS — M54.16 LUMBAR RADICULOPATHY, RIGHT: Primary | ICD-10-CM

## 2020-01-02 PROCEDURE — 97530 THERAPEUTIC ACTIVITIES: CPT | Performed by: PHYSICAL THERAPIST

## 2020-01-02 PROCEDURE — 97110 THERAPEUTIC EXERCISES: CPT | Performed by: PHYSICAL THERAPIST

## 2020-01-02 NOTE — PROGRESS NOTES
Daily Note     Today's date: 2020  Patient name: Vaishali Guerrero  : 1955  MRN: [id number]  Referring provider: Chantel Betancur MD  Dx:   Encounter Diagnosis     ICD-10-CM    1  Lumbar radiculopathy, right M54 16                   Subjective: States that she feels she can move her ankle more  Objective: See treatment diary below       Assessment: Initiated early level ankle strengthening this session  She states she feels like she can mostly self manage her LBP, but still has the most trouble with her ankle  Will continue ankle progressive strengthening as tolerated  She would benefit from continued PT services to reduce pain and increase level of function  Plan: Continue per plan of care        Precautions:       Manual           Education  15', MM MM, 10' MM 8'         Ankle PROM    MM + mobs                                                    Exercise Diary           RFISit HEP Reviewed, x20           SKTC/DKTC HEP Reviewed           Sciatic nerve glides R HEP Reviewed           3-way p ball  10x5" ea           Seated QL stretch  10x5"           Prayer stretch  5x10"           Cat/cow  x10           LTR w/ UE  10x5" ea 10x5" ea          RENA   3'          TA sets   10x10"          Ankle isometrics    10x10" ea                                                                          VG   25% 5' 25% 8' 20x HR                                                    Modalities

## 2020-01-08 ENCOUNTER — OFFICE VISIT (OUTPATIENT)
Dept: PHYSICAL THERAPY | Facility: REHABILITATION | Age: 65
End: 2020-01-08
Payer: MEDICARE

## 2020-01-08 DIAGNOSIS — M54.16 LUMBAR RADICULOPATHY, RIGHT: Primary | ICD-10-CM

## 2020-01-08 PROCEDURE — 97110 THERAPEUTIC EXERCISES: CPT | Performed by: PHYSICAL THERAPIST

## 2020-01-08 PROCEDURE — 97112 NEUROMUSCULAR REEDUCATION: CPT | Performed by: PHYSICAL THERAPIST

## 2020-01-08 NOTE — PROGRESS NOTES
Daily Note     Today's date: 2020  Patient name: Jovita Thomas  : 1955  MRN: [id number]  Referring provider: Jass Corado MD  Dx:   Encounter Diagnosis     ICD-10-CM    1  Lumbar radiculopathy, right M54 16                   Subjective: Reports improved standing and walking tolerance  Objective: See treatment diary below       Assessment: Progressed to hip, lower back, and ankle strengthening this session w/o reports of increase in symptoms  She would benefit from continued PT services to reduce pain and increase level of function  Plan: Continue per plan of care        Precautions:       Manual          Education  15', MM MM, 10' MM 8'         Ankle PROM    MM + mobs MM                                                   Exercise Diary          RFISit HEP Reviewed, x20           SKTC/DKTC HEP Reviewed           Sciatic nerve harsha R HEP Reviewed           3-way p ball  10x5" ea           Seated QL stretch  10x5"           Prayer stretch  5x10"           Cat/cow  x10           LTR w/ UE  10x5" ea 10x5" ea          RENA   3'          TA sets   10x10"          Ankle isometrics    10x10" ea         TA w/ march     10x5" ea        Elisa curl-up     3x8x2"        Prone hip ext      x10 ea                                  VG   25% 5' 25% 8' 20x HR 35% 8' 20x HR                                                   Modalities

## 2020-01-15 ENCOUNTER — OFFICE VISIT (OUTPATIENT)
Dept: PHYSICAL THERAPY | Facility: REHABILITATION | Age: 65
End: 2020-01-15
Payer: MEDICARE

## 2020-01-15 DIAGNOSIS — M54.16 LUMBAR RADICULOPATHY, RIGHT: Primary | ICD-10-CM

## 2020-01-15 DIAGNOSIS — S93.401A SPRAIN OF RIGHT ANKLE, UNSPECIFIED LIGAMENT, INITIAL ENCOUNTER: ICD-10-CM

## 2020-01-15 PROCEDURE — 97110 THERAPEUTIC EXERCISES: CPT | Performed by: PHYSICAL THERAPIST

## 2020-01-15 PROCEDURE — 97140 MANUAL THERAPY 1/> REGIONS: CPT | Performed by: PHYSICAL THERAPIST

## 2020-01-15 NOTE — PROGRESS NOTES
Daily Note     Today's date: 1/15/2020  Patient name: Severiano Lo  : 1955  MRN: [id number]  Referring provider: Ángel Quevedo MD  Dx:   Encounter Diagnosis     ICD-10-CM    1  Lumbar radiculopathy, right M54 16                   Subjective: Reports continued improvement of lower back and ankle symptoms  Saw ankle specialist on Friday and was provided a lace-up brace  States that she is able to walk more with the brace but it makes her ankle more stiff  Reports 2 days ago she had no LBP or radicular symptoms, first time in months  Objective: See treatment diary below       Assessment: Introduced extension progression into treatment, as patient is able to self manage symptoms with flexion based program for lower back  She would benefit from continued PT to reduce pain and increase level of function  Plan: Continue per plan of care        Precautions:       Manual  12/11 12/18 12/23 1/2 1/8 1/15       Education  15', MM MM, 10' MM 8'         Ankle PROM    MM + mobs MM MM + TC/ST mobs                                                  Exercise Diary  12/11 12/18 12/23 1/2 1/8 1/15       RFISit HEP Reviewed, x20    x20       SKTC/DKTC HEP Reviewed           Sciatic nerve glides R HEP Reviewed           3-way p ball  10x5" ea           Seated QL stretch  10x5"           Prayer stretch  5x10"           Cat/cow  x10           LTR w/ UE  10x5" ea 10x5" ea          RENA   3'   4x max       TA sets   10x10"          Ankle isometrics    10x10" ea         TA w/ march     10x5" ea Reviewed       Elisa curl-up     3x8x2" Reviewed       Prone hip ext      x10 ea Reviewed                                 VG   25% 5' 25% 8' 20x HR 35% 8' 20x HR 40% 8'                                                   Modalities

## 2020-01-22 ENCOUNTER — OFFICE VISIT (OUTPATIENT)
Dept: PHYSICAL THERAPY | Facility: REHABILITATION | Age: 65
End: 2020-01-22
Payer: MEDICARE

## 2020-01-22 DIAGNOSIS — M54.16 LUMBAR RADICULOPATHY, RIGHT: Primary | ICD-10-CM

## 2020-01-22 DIAGNOSIS — S93.401A SPRAIN OF RIGHT ANKLE, UNSPECIFIED LIGAMENT, INITIAL ENCOUNTER: ICD-10-CM

## 2020-01-22 PROCEDURE — 97112 NEUROMUSCULAR REEDUCATION: CPT | Performed by: PHYSICAL THERAPIST

## 2020-01-22 PROCEDURE — 97110 THERAPEUTIC EXERCISES: CPT | Performed by: PHYSICAL THERAPIST

## 2020-01-22 PROCEDURE — 97140 MANUAL THERAPY 1/> REGIONS: CPT | Performed by: PHYSICAL THERAPIST

## 2020-01-22 NOTE — PROGRESS NOTES
Daily Note     Today's date: 2020  Patient name: John Patel  : 1955  MRN: [id number]  Referring provider: Stephan Kennedy MD  Dx:   Encounter Diagnosis     ICD-10-CM    1  Lumbar radiculopathy, right M54 16    2  Sprain of right ankle, unspecified ligament, initial encounter S93 401A                   Subjective: States that she saw spine specialist last week, was told to stop all exercises for her lower back and was referred to aqua therapy  She wishes to continue with some lower back exercises at home as some are able to reliably relieve her symptoms  She was referred to aquatic therapy by spine specialist  She is beginning aquatic therapy this week  States she feels the ankle is improving since using the brace but still experiences occasional radicular symptoms, especially during movements involving lumbar extension  Objective: See treatment diary below       Assessment: She was encouraged to continue lower back program at home, as she finds some relief and has made some improvement with it  Attempted somcee weightbearing exercises to introduce CKC strengthening for her ankle, but unable to complete due to reduce standing tolerance and increase in radicular symptoms, these exercises were most likely limited by her low back  Attempted seated weight shifts w/o change in ankle symptoms  She would benefit from continued PT to reduce pain and increase level of function  Plan: Continue per plan of care        Precautions:       Manual  12/11 12/18 12/23 1/2 1/8 1/15 1/22      Education  15', MM MM, 10' MM 8'         Ankle PROM    MM + mobs MM MM + TC/ST mobs MM +   TC/ST mobs                                                 Exercise Diary  12/11 12/18 12/23 1/2 1/8 1/15 1/22      RFISit HEP Reviewed, x20    x20       SKTC/DKTC HEP Reviewed           Sciatic nerve harsha R HEP Reviewed           3-way p ball  10x5" ea           Seated QL stretch  10x5"           Prayer stretch  5x10" Cat/cow  x10           LTR w/ UE  10x5" ea 10x5" ea          RENA   3'   4x max       TA sets   10x10"          Ankle isometrics    10x10" ea         TA w/ march     10x5" ea Reviewed       Elisa curl-up     3x8x2" Reviewed       Prone hip ext      x10 ea Reviewed       TM pawing       5'  mph      Seated/standing weight shifts       x20 ea      VG   25% 5' 25% 8' 20x HR 35% 8' 20x HR 40% 8'  40% 8'                                                 Modalities

## 2020-01-29 ENCOUNTER — OFFICE VISIT (OUTPATIENT)
Dept: PHYSICAL THERAPY | Facility: REHABILITATION | Age: 65
End: 2020-01-29
Payer: MEDICARE

## 2020-01-29 DIAGNOSIS — S93.401A SPRAIN OF RIGHT ANKLE, UNSPECIFIED LIGAMENT, INITIAL ENCOUNTER: ICD-10-CM

## 2020-01-29 DIAGNOSIS — M54.16 LUMBAR RADICULOPATHY, RIGHT: Primary | ICD-10-CM

## 2020-01-29 PROCEDURE — 97112 NEUROMUSCULAR REEDUCATION: CPT | Performed by: PHYSICAL MEDICINE & REHABILITATION

## 2020-01-29 PROCEDURE — 97110 THERAPEUTIC EXERCISES: CPT | Performed by: PHYSICAL MEDICINE & REHABILITATION

## 2020-01-29 PROCEDURE — 97140 MANUAL THERAPY 1/> REGIONS: CPT | Performed by: PHYSICAL MEDICINE & REHABILITATION

## 2020-01-29 NOTE — PROGRESS NOTES
Daily Note     Today's date: 2020  Patient name: Melvina Hatfield  : 1955  MRN: [id number]  Referring provider: Santos Cruz MD  Dx:   Encounter Diagnosis     ICD-10-CM    1  Lumbar radiculopathy, right M54 16    2  Sprain of right ankle, unspecified ligament, initial encounter S93 401A                   Subjective: Patient notes increased back sxs with beginning aquatic therapy, reports overall improvement in ankle symptoms at this time  Objective: See treatment diary below       Assessment: She was encouraged to continue lower back program at home, as she finds some relief and has made some improvement with it  Attempted somcee weightbearing exercises to introduce CKC strengthening for her ankle, but unable to complete due to reduce standing tolerance and increase in radicular symptoms, these exercises were most likely limited by her low back  Attempted seated weight shifts w/o change in ankle symptoms  She would benefit from continued PT to reduce pain and increase level of function  Plan: Continue per plan of care        Precautions:       Manual  12/11 12/18 12/23 1/2 1/8 1/15 1/22 1/29     Education  15', MM MM, 10' MM 8'         Ankle PROM    MM + mobs MM MM + TC/ST mobs MM +   TC/ST mobs [facility]                                                Exercise Diary  12/11 12/18 12/23 1/2 1/8 1/15 1/22 1/29     RFISit HEP Reviewed, x20    x20       SKTC/DKTC HEP Reviewed           Sciatic nerve glides R HEP Reviewed           3-way p ball  10x5" ea           Seated QL stretch  10x5"           Prayer stretch  5x10"           Cat/cow  x10           LTR w/ UE  10x5" ea 10x5" ea          RENA   3'   4x max       TA sets   10x10"          Ankle isometrics    10x10" ea         TA w/ march     10x5" ea Reviewed       Elisa curl-up     3x8x2" Reviewed       Prone hip ext      x10 ea Reviewed       TM pawing       5'  mph      Seated/standing weight shifts       x20 ea 20x ea     VG   25% 5' 25% 8' 20x HR 35% 8' 20x HR 40% 8'  40% 8' 8'     Seated wobbleboard        [id number] ea     Seated HR/TR        20x ea     Inv/ev w/ 1/2 roll, PF/DF w/ full roll seated        20x ea         Modalities

## 2020-02-05 ENCOUNTER — OFFICE VISIT (OUTPATIENT)
Dept: PHYSICAL THERAPY | Facility: REHABILITATION | Age: 65
End: 2020-02-05
Payer: MEDICARE

## 2020-02-05 DIAGNOSIS — M54.16 LUMBAR RADICULOPATHY, RIGHT: Primary | ICD-10-CM

## 2020-02-05 DIAGNOSIS — S93.401A SPRAIN OF RIGHT ANKLE, UNSPECIFIED LIGAMENT, INITIAL ENCOUNTER: ICD-10-CM

## 2020-02-05 PROCEDURE — 97110 THERAPEUTIC EXERCISES: CPT | Performed by: PHYSICAL THERAPIST

## 2020-02-05 NOTE — PROGRESS NOTES
Daily Note     Today's date: 2020  Patient name: Sena Kramer  : 1955  MRN: [id number]  Referring provider: Aguilar Hathaway MD  Dx:   Encounter Diagnosis     ICD-10-CM    1  Lumbar radiculopathy, right M54 16    2  Sprain of right ankle, unspecified ligament, initial encounter S93 401A                   Subjective: States that everything is starting to feel better, is using lace up brace instead of heavy one this session  Objective: See treatment diary below       Assessment: Fatigued with seated ankle progressions this session  Attempted standing exercises this session but increased in radicular symptoms and unable to complete  Will consider d/c of right ankle as this seems to be resolving, is limited functionally solely by her lower back and radicular symptoms  Plan: Continue per plan of care        Precautions:       Manual  12/11 12/18 12/23 1/2 1/8 1/15 1/22 1/29 2/5    Education  15', MM MM, 10' MM 8'         Ankle PROM    MM + mobs MM MM + TC/ST mobs MM +   TC/ST mobs [facility] MM                                               Exercise Diary  12/11 12/18 12/23 1/2 1/8 1/15 1/22 1/29 25    RFISit HEP Reviewed, x20    x20       SKTC/DKTC HEP Reviewed           Sciatic nerve glides R HEP Reviewed           3-way p ball  10x5" ea           Seated QL stretch  10x5"           Prayer stretch  5x10"           Cat/cow  x10           LTR w/ UE  10x5" ea 10x5" ea          RENA   3'   4x max       TA sets   10x10"          Ankle isometrics    10x10" ea         TA / march     10x5" ea Reviewed   Lateral lunge x6    Elisa curl-up     3x8x2" Reviewed       Prone hip ext      x10 ea Reviewed   Weighted ball ABCs 2x    TM pawing       5'  mph      Seated/standing weight shifts       x20 ea 20x ea     VG   25% 5' 25% 8' 20x HR 35% 8' 20x HR 40% 8'  40% 8' 8' 8' 40%    Seated wobbleboard        [id number] ea CW/CCW/lat/fwd/bck 25x ea    Seated HR/TR        20x ea     Inv/ev w/ 1/2 roll, PF/DF w/ full roll seated        20x ea         Modalities

## 2020-02-12 ENCOUNTER — APPOINTMENT (OUTPATIENT)
Dept: PHYSICAL THERAPY | Facility: REHABILITATION | Age: 65
End: 2020-02-12
Payer: MEDICARE

## 2020-02-19 ENCOUNTER — APPOINTMENT (OUTPATIENT)
Dept: PHYSICAL THERAPY | Facility: REHABILITATION | Age: 65
End: 2020-02-19
Payer: MEDICARE

## 2021-02-13 DIAGNOSIS — Z23 ENCOUNTER FOR IMMUNIZATION: ICD-10-CM

## 2021-02-19 ENCOUNTER — IMMUNIZATIONS (OUTPATIENT)
Dept: FAMILY MEDICINE CLINIC | Facility: HOSPITAL | Age: 66
End: 2021-02-19

## 2021-02-19 DIAGNOSIS — Z23 ENCOUNTER FOR IMMUNIZATION: Primary | ICD-10-CM

## 2021-02-19 PROCEDURE — 0001A SARS-COV-2 / COVID-19 MRNA VACCINE (PFIZER-BIONTECH) 30 MCG: CPT

## 2021-02-19 PROCEDURE — 91300 SARS-COV-2 / COVID-19 MRNA VACCINE (PFIZER-BIONTECH) 30 MCG: CPT

## 2021-03-11 ENCOUNTER — IMMUNIZATIONS (OUTPATIENT)
Dept: FAMILY MEDICINE CLINIC | Facility: HOSPITAL | Age: 66
End: 2021-03-11

## 2021-03-11 DIAGNOSIS — Z23 ENCOUNTER FOR IMMUNIZATION: Primary | ICD-10-CM

## 2021-03-11 PROCEDURE — 0002A SARS-COV-2 / COVID-19 MRNA VACCINE (PFIZER-BIONTECH) 30 MCG: CPT

## 2021-03-11 PROCEDURE — 91300 SARS-COV-2 / COVID-19 MRNA VACCINE (PFIZER-BIONTECH) 30 MCG: CPT

## 2021-08-28 ENCOUNTER — IMMUNIZATIONS (OUTPATIENT)
Dept: FAMILY MEDICINE CLINIC | Facility: HOSPITAL | Age: 66
End: 2021-08-28

## 2021-08-28 DIAGNOSIS — Z23 ENCOUNTER FOR IMMUNIZATION: Primary | ICD-10-CM

## 2021-08-28 PROCEDURE — 91300 SARS-COV-2 / COVID-19 MRNA VACCINE (PFIZER-BIONTECH) 30 MCG: CPT

## 2021-08-28 PROCEDURE — 0001A SARS-COV-2 / COVID-19 MRNA VACCINE (PFIZER-BIONTECH) 30 MCG: CPT

## 2021-12-21 ENCOUNTER — HOSPITAL ENCOUNTER (OUTPATIENT)
Dept: RADIOLOGY | Age: 66
Discharge: HOME | End: 2021-12-21
Payer: MEDICARE

## 2021-12-21 VITALS — WEIGHT: 168 LBS | BODY MASS INDEX: 27 KG/M2 | HEIGHT: 66 IN

## 2021-12-21 DIAGNOSIS — Z12.31 SCREENING MAMMOGRAM FOR BREAST CANCER: ICD-10-CM

## 2021-12-21 PROCEDURE — 77067 SCR MAMMO BI INCL CAD: CPT

## 2021-12-21 PROCEDURE — 77063 BREAST TOMOSYNTHESIS BI: CPT

## 2021-12-28 ENCOUNTER — HOSPITAL ENCOUNTER (OUTPATIENT)
Dept: ULTRASOUND IMAGING | Facility: CLINIC | Age: 66
Discharge: HOME | End: 2021-12-28
Payer: MEDICARE

## 2021-12-28 DIAGNOSIS — R92.8 ABNORMAL SCREENING MAMMOGRAM: ICD-10-CM

## 2021-12-28 PROCEDURE — 76642 ULTRASOUND BREAST LIMITED: CPT

## 2022-06-29 ENCOUNTER — HOSPITAL ENCOUNTER (OUTPATIENT)
Dept: ULTRASOUND IMAGING | Facility: CLINIC | Age: 67
Discharge: HOME | End: 2022-06-29
Payer: MEDICARE

## 2022-06-29 VITALS — HEIGHT: 66 IN | BODY MASS INDEX: 27 KG/M2 | WEIGHT: 167.99 LBS

## 2022-06-29 DIAGNOSIS — R92.8 ABNORMAL MAMMOGRAM: ICD-10-CM

## 2022-06-29 PROCEDURE — 76642 ULTRASOUND BREAST LIMITED: CPT

## 2022-11-02 ENCOUNTER — OFFICE VISIT (OUTPATIENT)
Dept: GASTROENTEROLOGY | Facility: CLINIC | Age: 67
End: 2022-11-02

## 2022-11-02 VITALS
SYSTOLIC BLOOD PRESSURE: 112 MMHG | BODY MASS INDEX: 26.2 KG/M2 | TEMPERATURE: 97.7 F | DIASTOLIC BLOOD PRESSURE: 64 MMHG | WEIGHT: 163 LBS | HEIGHT: 66 IN

## 2022-11-02 DIAGNOSIS — R19.5 POSITIVE COLORECTAL CANCER SCREENING USING COLOGUARD TEST: Primary | ICD-10-CM

## 2022-11-02 RX ORDER — FOLIC ACID 1 MG/1
1 TABLET ORAL EVERY MORNING
COMMUNITY

## 2022-11-02 RX ORDER — METHOCARBAMOL 750 MG/1
TABLET, FILM COATED ORAL
COMMUNITY
End: 2022-11-08 | Stop reason: ALTCHOICE

## 2022-11-02 NOTE — PROGRESS NOTES
[facility] - Outpatient Consultation  [facility] 78 y o  female MRN: [id number]  Encounter: [id number]          ASSESSMENT AND PLAN:      Jair Palencia is a 79 y/o female with HLD who presents for consultation of positive cologuard  1  Positive cologuard  +cologuard 10/25  Pt denies prior colonoscopy  Pt denies family hx of colon cancer, constitutional symptoms, changes in bowel habits, bloody or black BMs  Pt requests miralax prep   -colonoscopy ordered; instructions given; risks and benefits reviewed   ______________________________________________________________________    HPI:  Jair Palencia is a 79 y/o female with HLD who presents for consultation of positive cologuard  Pt denies family hx of colon cancer, unintentional weight loss, fevers, chills, night sweats, bloody or black BMs  Pt denies abdominal pain, n/v, heartburn or reflux, trouble swallowing, constipation, diarrhea  Pt has prior abdominal surgical hx of   Pt is not on blood thinners  Pt denies  Frequent NSAID use  REVIEW OF SYSTEMS:    CONSTITUTIONAL: Denies any fever, chills, rigors, and weight loss  HEENT: No earache or tinnitus  Denies hearing loss or visual disturbances  CARDIOVASCULAR: No chest pain or palpitations  RESPIRATORY: Denies any cough, hemoptysis, shortness of breath or dyspnea on exertion  GASTROINTESTINAL: As noted in the History of Present Illness  GENITOURINARY: No problems with urination  Denies any hematuria or dysuria  NEUROLOGIC: No dizziness or vertigo, denies headaches  MUSCULOSKELETAL: Denies any muscle or joint pain  SKIN: Denies skin rashes or itching  ENDOCRINE: Denies excessive thirst  Denies intolerance to heat or cold  PSYCHOSOCIAL: Denies depression or anxiety  Denies any recent memory loss         Historical Information   Past Medical History:   Diagnosis Date   • Hyperlipemia    • Skin cancer     all 3 types     Past Surgical History:   Procedure Laterality Date • BREAST EXCISIONAL BIOPSY Left     approximately    •  SECTION       Social History   Social History     Substance and Sexual Activity   Alcohol Use Yes    Comment: social     Social History     Substance and Sexual Activity   Drug Use Never     Social History     Tobacco Use   Smoking Status Never Smoker   Smokeless Tobacco Never Used     Family History   Problem Relation Age of Onset   • No Known Problems Mother    • No Known Problems Father    • No Known Problems Daughter    • No Known Problems Maternal Grandmother    • Prostate cancer Maternal Grandfather [de-identified]   • Leukemia Maternal Grandfather [de-identified]   • No Known Problems Paternal Grandmother    • No Known Problems Paternal Grandfather    • No Known Problems Son    • No Known Problems Maternal Aunt    • No Known Problems Maternal Aunt    • No Known Problems Paternal Aunt    • No Known Problems Paternal Aunt    • Lymphoma Brother 28       Meds/Allergies       Current Outpatient Medications:   •  atorvastatin (LIPITOR) 10 mg tablet  •  pindolol (VISKEN) 5 mg tablet    Allergies   Allergen Reactions   • Other Other (See Comments)     macrofurodantin--pt gets violently ill   • Sulfa Antibiotics Rash           Objective     not currently breastfeeding  There is no height or weight on file to calculate BMI  PHYSICAL EXAM:      General Appearance:   Alert, cooperative, no distress   HEENT:   Normocephalic, atraumatic, anicteric      Neck:  Supple, symmetrical, trachea midline   Lungs:   Clear to auscultation bilaterally; no rales, rhonchi or wheezing; respirations unlabored    Heart[de-identified]   Regular rate and rhythm; no murmur, rub, or gallop     Abdomen:   Soft, non-tender, non-distended; normal bowel sounds; no masses, no organomegaly    Genitalia:   Deferred    Rectal:   Deferred    Extremities:  No cyanosis, clubbing or edema    Pulses:  2+ and symmetric    Skin:  No jaundice, rashes, or lesions    Lymph nodes:  No palpable cervical lymphadenopathy  Lab Results:   No visits with results within 1 Day(s) from this visit  Latest known visit with results is:   No results found for any previous visit  Radiology Results:   No results found

## 2022-11-02 NOTE — PATIENT INSTRUCTIONS
Scheduled date of colonoscopy (as of today):11 08 22  Physician performing colonoscopy:DR LOUIE  Location of colonoscopy:[facility]  Bowel prep reviewed with patient:MIRALAX/DULCOLAX  Instructions reviewed with patient by:JOSSY  Clearances: N/A

## 2022-11-02 NOTE — H&P (VIEW-ONLY)
[facility] - Outpatient Consultation  [facility] 78 y o  female MRN: [id number]  Encounter: [id number]          ASSESSMENT AND PLAN:      Andrey Gan is a 79 y/o female with HLD who presents for consultation of positive cologuard  1  Positive cologuard  +cologuard 10/25  Pt denies prior colonoscopy  Pt denies family hx of colon cancer, constitutional symptoms, changes in bowel habits, bloody or black BMs  Pt requests miralax prep   -colonoscopy ordered; instructions given; risks and benefits reviewed   ______________________________________________________________________    HPI:  Andrey Gan is a 79 y/o female with HLD who presents for consultation of positive cologuard  Pt denies family hx of colon cancer, unintentional weight loss, fevers, chills, night sweats, bloody or black BMs  Pt denies abdominal pain, n/v, heartburn or reflux, trouble swallowing, constipation, diarrhea  Pt has prior abdominal surgical hx of   Pt is not on blood thinners  Pt denies  Frequent NSAID use  REVIEW OF SYSTEMS:    CONSTITUTIONAL: Denies any fever, chills, rigors, and weight loss  HEENT: No earache or tinnitus  Denies hearing loss or visual disturbances  CARDIOVASCULAR: No chest pain or palpitations  RESPIRATORY: Denies any cough, hemoptysis, shortness of breath or dyspnea on exertion  GASTROINTESTINAL: As noted in the History of Present Illness  GENITOURINARY: No problems with urination  Denies any hematuria or dysuria  NEUROLOGIC: No dizziness or vertigo, denies headaches  MUSCULOSKELETAL: Denies any muscle or joint pain  SKIN: Denies skin rashes or itching  ENDOCRINE: Denies excessive thirst  Denies intolerance to heat or cold  PSYCHOSOCIAL: Denies depression or anxiety  Denies any recent memory loss         Historical Information   Past Medical History:   Diagnosis Date   • Hyperlipemia    • Skin cancer     all 3 types     Past Surgical History:   Procedure Laterality Date • BREAST EXCISIONAL BIOPSY Left     approximately    •  SECTION       Social History   Social History     Substance and Sexual Activity   Alcohol Use Yes    Comment: social     Social History     Substance and Sexual Activity   Drug Use Never     Social History     Tobacco Use   Smoking Status Never Smoker   Smokeless Tobacco Never Used     Family History   Problem Relation Age of Onset   • No Known Problems Mother    • No Known Problems Father    • No Known Problems Daughter    • No Known Problems Maternal Grandmother    • Prostate cancer Maternal Grandfather [de-identified]   • Leukemia Maternal Grandfather [de-identified]   • No Known Problems Paternal Grandmother    • No Known Problems Paternal Grandfather    • No Known Problems Son    • No Known Problems Maternal Aunt    • No Known Problems Maternal Aunt    • No Known Problems Paternal Aunt    • No Known Problems Paternal Aunt    • Lymphoma Brother 28       Meds/Allergies       Current Outpatient Medications:   •  atorvastatin (LIPITOR) 10 mg tablet  •  pindolol (VISKEN) 5 mg tablet    Allergies   Allergen Reactions   • Other Other (See Comments)     macrofurodantin--pt gets violently ill   • Sulfa Antibiotics Rash           Objective     not currently breastfeeding  There is no height or weight on file to calculate BMI  PHYSICAL EXAM:      General Appearance:   Alert, cooperative, no distress   HEENT:   Normocephalic, atraumatic, anicteric      Neck:  Supple, symmetrical, trachea midline   Lungs:   Clear to auscultation bilaterally; no rales, rhonchi or wheezing; respirations unlabored    Heart[de-identified]   Regular rate and rhythm; no murmur, rub, or gallop     Abdomen:   Soft, non-tender, non-distended; normal bowel sounds; no masses, no organomegaly    Genitalia:   Deferred    Rectal:   Deferred    Extremities:  No cyanosis, clubbing or edema    Pulses:  2+ and symmetric    Skin:  No jaundice, rashes, or lesions    Lymph nodes:  No palpable cervical lymphadenopathy  Lab Results:   No visits with results within 1 Day(s) from this visit  Latest known visit with results is:   No results found for any previous visit  Radiology Results:   No results found

## 2022-11-08 ENCOUNTER — HOSPITAL ENCOUNTER (OUTPATIENT)
Dept: GASTROENTEROLOGY | Facility: AMBULARY SURGERY CENTER | Age: 67
Setting detail: OUTPATIENT SURGERY
Discharge: HOME | End: 2022-11-08

## 2022-11-08 ENCOUNTER — ANESTHESIA (OUTPATIENT)
Dept: GASTROENTEROLOGY | Facility: AMBULARY SURGERY CENTER | Age: 67
End: 2022-11-08

## 2022-11-08 ENCOUNTER — ANESTHESIA EVENT (OUTPATIENT)
Dept: GASTROENTEROLOGY | Facility: AMBULARY SURGERY CENTER | Age: 67
End: 2022-11-08

## 2022-11-08 VITALS
OXYGEN SATURATION: 97 % | DIASTOLIC BLOOD PRESSURE: 71 MMHG | BODY MASS INDEX: 25.71 KG/M2 | WEIGHT: 160 LBS | TEMPERATURE: 97.1 F | RESPIRATION RATE: 22 BRPM | SYSTOLIC BLOOD PRESSURE: 111 MMHG | HEART RATE: 62 BPM | HEIGHT: 66 IN

## 2022-11-08 DIAGNOSIS — R19.5 POSITIVE COLORECTAL CANCER SCREENING USING COLOGUARD TEST: ICD-10-CM

## 2022-11-08 PROBLEM — M54.50 LOW BACK PAIN: Status: ACTIVE | Noted: 2020-10-07

## 2022-11-08 PROBLEM — H35.60 RETINAL HEMORRHAGE: Status: ACTIVE | Noted: 2019-05-13

## 2022-11-08 PROBLEM — H35.379 EPIRETINAL MEMBRANE: Status: ACTIVE | Noted: 2019-05-13

## 2022-11-08 PROBLEM — H43.10 VITREOUS HEMORRHAGE (HCC): Status: ACTIVE | Noted: 2021-04-07

## 2022-11-08 PROBLEM — H43.819 POSTERIOR VITREOUS DETACHMENT: Status: ACTIVE | Noted: 2019-05-13

## 2022-11-08 PROBLEM — H33.301: Status: ACTIVE | Noted: 2021-04-07

## 2022-11-08 PROBLEM — H25.13 AGE-RELATED NUCLEAR CATARACT OF BOTH EYES: Status: ACTIVE | Noted: 2019-05-13

## 2022-11-08 PROBLEM — M54.16 LUMBAR RADICULOPATHY: Status: ACTIVE | Noted: 2020-10-07

## 2022-11-08 PROBLEM — M51.26 DISPLACEMENT OF LUMBAR INTERVERTEBRAL DISC WITHOUT MYELOPATHY: Status: ACTIVE | Noted: 2020-10-07

## 2022-11-08 RX ORDER — SODIUM CHLORIDE, SODIUM LACTATE, POTASSIUM CHLORIDE, CALCIUM CHLORIDE 600; 310; 30; 20 MG/100ML; MG/100ML; MG/100ML; MG/100ML
INJECTION, SOLUTION INTRAVENOUS CONTINUOUS PRN
Status: DISCONTINUED | OUTPATIENT
Start: 2022-11-08 | End: 2022-11-08

## 2022-11-08 RX ORDER — PROPOFOL 10 MG/ML
INJECTION, EMULSION INTRAVENOUS CONTINUOUS PRN
Status: DISCONTINUED | OUTPATIENT
Start: 2022-11-08 | End: 2022-11-08

## 2022-11-08 RX ORDER — PROPOFOL 10 MG/ML
INJECTION, EMULSION INTRAVENOUS AS NEEDED
Status: DISCONTINUED | OUTPATIENT
Start: 2022-11-08 | End: 2022-11-08

## 2022-11-08 RX ADMIN — PROPOFOL 30 MG: 10 INJECTION, EMULSION INTRAVENOUS at 11:43

## 2022-11-08 RX ADMIN — PROPOFOL 50 MG: 10 INJECTION, EMULSION INTRAVENOUS at 11:49

## 2022-11-08 RX ADMIN — PROPOFOL 100 MCG/KG/MIN: 10 INJECTION, EMULSION INTRAVENOUS at 11:43

## 2022-11-08 RX ADMIN — SODIUM CHLORIDE, SODIUM LACTATE, POTASSIUM CHLORIDE, AND CALCIUM CHLORIDE: .6; .31; .03; .02 INJECTION, SOLUTION INTRAVENOUS at 11:30

## 2022-11-08 RX ADMIN — PROPOFOL 70 MG: 10 INJECTION, EMULSION INTRAVENOUS at 11:41

## 2022-11-08 RX ADMIN — PROPOFOL 30 MG: 10 INJECTION, EMULSION INTRAVENOUS at 11:45

## 2022-11-08 NOTE — INTERVAL H&P NOTE
H&P reviewed  After examining the patient I find no changes in the patients condition since the H&P had been written      Vitals:    11/08/22 1129   BP: 145/67   Pulse: 65   Resp: 16   Temp: (!) 96 4 °F (35 8 °C)   SpO2: 98%

## 2022-11-08 NOTE — ANESTHESIA PREPROCEDURE EVALUATION
Procedure:  COLONOSCOPY     - denies any chest pain, palpitations, shortness of breath, syncope, lightheadedness, seizures   - denies any recent infectious symptoms such as fevers, chills, cough   - denies taking any anticoagulation medications or any issues with bleeding, bruising, clotting      Relevant Problems   ANESTHESIA (within normal limits)      CARDIO (within normal limits)   (+) Mitral valve disorder      ENDO (within normal limits)      GI/HEPATIC (within normal limits)      /RENAL (within normal limits)      GYN (within normal limits)      HEMATOLOGY (within normal limits)      MUSCULOSKELETAL   (+) Low back pain      NEURO/PSYCH (within normal limits)      PULMONARY (within normal limits)      Nervous and Auditory   (+) Lumbar radiculopathy      Musculoskeletal and Integument   (+) Displacement of lumbar intervertebral disc without myelopathy      Other   (+) Age-related nuclear cataract of both eyes   (+) Palpitations        Physical Exam    Airway    Mallampati score: II  TM Distance: >3 FB  Neck ROM: full     Dental   No notable dental hx     Cardiovascular  Rhythm: regular, Rate: normal, Cardiovascular exam normal    Pulmonary  Pulmonary exam normal Breath sounds clear to auscultation,     Other Findings        Anesthesia Plan  ASA Score- 2     Anesthesia Type- IV sedation with anesthesia with ASA Monitors  Additional Monitors:   Airway Plan:           Plan Factors-Exercise tolerance (METS): >4 METS  Chart reviewed  EKG reviewed  Imaging results reviewed  Existing labs reviewed  Patient summary reviewed  Patient is not a current smoker  Patient did not smoke on day of surgery  Obstructive sleep apnea risk education given perioperatively  Induction- intravenous  Postoperative Plan-     Informed Consent- Anesthetic plan and risks discussed with patient  I personally reviewed this patient with the CRNA  Discussed and agreed on the Anesthesia Plan with the CRNA  Kenrick Matthews

## 2022-11-08 NOTE — ANESTHESIA POSTPROCEDURE EVALUATION
Post-Op Assessment Note    CV Status:  Stable  Pain Score: 0    Pain management: adequate     Mental Status:  Alert and awake   Hydration Status:  Euvolemic   PONV Controlled:  Controlled   Airway Patency:  Patent      Post Op Vitals Reviewed: Yes            No complications documented      BP   115/66   Temp     Pulse  66   Resp   20   SpO2   99

## 2022-11-10 ENCOUNTER — TELEPHONE (OUTPATIENT)
Dept: GASTROENTEROLOGY | Facility: AMBULARY SURGERY CENTER | Age: 67
End: 2022-11-10

## 2022-11-10 NOTE — TELEPHONE ENCOUNTER
Patients GI provider:  [facility] [address] [address]    Number to return call: ( [phone]    Reason for call: Pt calling to schedule 3 month repeat colon to remove large polyp    Scheduled procedure/appointment date if applicable: Apt/procedure  12/6/22

## 2022-11-16 ENCOUNTER — TELEPHONE (OUTPATIENT)
Dept: GASTROENTEROLOGY | Facility: CLINIC | Age: 67
End: 2022-11-16

## 2022-11-16 DIAGNOSIS — Z12.11 SPECIAL SCREENING FOR MALIGNANT NEOPLASMS, COLON: Primary | ICD-10-CM

## 2022-11-16 NOTE — TELEPHONE ENCOUNTER
Returned patient's call and LMOM asking her to return our call to schedule repeat colonoscopy  3 month from 11/8/22

## 2022-11-18 NOTE — TELEPHONE ENCOUNTER
Patient called back to schedule procedure  She states Dr Meseret Brink recommended she come back for colonoscopy in 3 months to have a large polyp removed with another doctor   Can you please advise which doctor to schedule pt with for repeat colonoscopy

## 2022-11-21 ENCOUNTER — PREP FOR PROCEDURE (OUTPATIENT)
Dept: GASTROENTEROLOGY | Facility: CLINIC | Age: 67
End: 2022-11-21

## 2022-11-21 ENCOUNTER — TELEPHONE (OUTPATIENT)
Dept: GASTROENTEROLOGY | Facility: CLINIC | Age: 67
End: 2022-11-21

## 2022-11-21 DIAGNOSIS — K63.5 POLYP OF COLON, UNSPECIFIED PART OF COLON, UNSPECIFIED TYPE: Primary | ICD-10-CM

## 2022-11-21 NOTE — TELEPHONE ENCOUNTER
Scheduled date of colonoscopy (as of today): 02/13/2023  Physician performing colonoscopy: Dr Reyna Qiu   Location of colonoscopy:[facility]  Bowel prep reviewed with patient: TBD  Instructions reviewed with patient by: Elisabet Mcdowell   Clearances: N/A    Pt was instructed to get on Dr Demetri Kim schedule in anticipation of follow up colonoscopy  Pt has appt with Dr Reyna Qiu 12/06/2022  Schedule information will be completed after instructions received from Dr Reyna Qiu

## 2022-11-22 DIAGNOSIS — D12.6 TUBULAR ADENOMA OF COLON: Primary | ICD-10-CM

## 2022-12-06 ENCOUNTER — OFFICE VISIT (OUTPATIENT)
Dept: GASTROENTEROLOGY | Facility: CLINIC | Age: 67
End: 2022-12-06

## 2022-12-06 VITALS
SYSTOLIC BLOOD PRESSURE: 114 MMHG | HEIGHT: 66 IN | BODY MASS INDEX: 26.36 KG/M2 | TEMPERATURE: 98.2 F | WEIGHT: 164 LBS | DIASTOLIC BLOOD PRESSURE: 70 MMHG

## 2022-12-06 DIAGNOSIS — D12.0 ADENOMATOUS POLYP OF CECUM: Primary | ICD-10-CM

## 2022-12-06 NOTE — PATIENT INSTRUCTIONS
Scheduled date of colonoscopy (as of today):12 09 22  Physician performing colonoscopy:DR DOVER  Location of colonoscopy:[facility]  Bowel prep reviewed with patient:DULCOLAX/MIRALAX  Instructions reviewed with patient by:OFFICE  Clearances:  N/A

## 2022-12-06 NOTE — PROGRESS NOTES
[facility] - Outpatient Follow-up Note  Tristan Riley 75 y o  female MRN: [id number]  Encounter: [id number]          ASSESSMENT AND PLAN:      1  Adenomatous polyp of cecum  35 mm flat laterally spreading polyp at the IC valve  Pathology showed tubular adenoma  Polypectomy not attempted  We discussed endoscopic polypectomy vs surgical resection  I will schedule her for polypectomy ( EMR) with advanced endoscopist at [place] [address]  We also discussed possible referral to see some one who specializes in removing large polyps (ESD)  At this time she agreed with colonoscopy with possible EMR with local advance endoscopist     I also discussed with her that she may require multiple colonoscopies for complete resection and/or surveillance  Surgical consultation was also discussed but I think it's reasonable to hold off surgical consultation for now  Risk and benefits discussed - risks include bleeding, infection, perforation or missed lesions  ______________________________________________________________________    SUBJECTIVE:  She had colonoscopy after positive cologuard on routine screening colonoscopy  Four sessile polyps measuring 5-9 mm and once diminutive polyp removed  Large flat polyp at IC valve, measuring 35 mm was biopsied  Pathology showed tubular adenoma  Polypectomy was not attempted  She is here to discuss regarding follow up colonoscopy  REVIEW OF SYSTEMS IS OTHERWISE NEGATIVE        Historical Information   Past Medical History:   Diagnosis Date   • Heart murmur    • Hyperlipemia    • Skin cancer     all 3 types     Past Surgical History:   Procedure Laterality Date   • BREAST EXCISIONAL BIOPSY Left     approximately    •  SECTION      x2   • DILATION AND CURETTAGE OF UTERUS     • LAMINECTOMY     • WISDOM TOOTH EXTRACTION       Social History   Social History     Substance and Sexual Activity   Alcohol Use Yes    Comment: rarely Social History     Substance and Sexual Activity   Drug Use Never     Social History     Tobacco Use   Smoking Status Never   Smokeless Tobacco Never     Family History   Problem Relation Age of Onset   • No Known Problems Mother    • No Known Problems Father    • No Known Problems Daughter    • No Known Problems Maternal Grandmother    • Prostate cancer Maternal Grandfather [de-identified]   • Leukemia Maternal Grandfather [de-identified]   • No Known Problems Paternal Grandmother    • No Known Problems Paternal Grandfather    • No Known Problems Son    • No Known Problems Maternal Aunt    • No Known Problems Maternal Aunt    • No Known Problems Paternal Aunt    • No Known Problems Paternal Aunt    • Lymphoma Brother 28       Meds/Allergies       Current Outpatient Medications:   •  atorvastatin (LIPITOR) 10 mg tablet  •  Coenzyme Q10 (LUQ-37 PO)  •  folic acid (FOLVITE) 1 mg tablet  •  methotrexate 2 5 mg tablet  •  Multiple Vitamin (MULTIVITAMIN ADULT PO)  •  pindolol (VISKEN) 5 mg tablet    Allergies   Allergen Reactions   • Other Other (See Comments)     macrofurodantin--pt gets violently ill   • Medical Tape Other (See Comments)     "skin burns"   • Sulfa Antibiotics Rash           Objective     Blood pressure 114/70, temperature 98 2 °F (36 8 °C), temperature source Tympanic, height 5' 6" (1 676 m), weight 74 4 kg (164 lb), not currently breastfeeding  Body mass index is 26 47 kg/m²  PHYSICAL EXAM:      General Appearance:   Alert, cooperative, no distress   HEENT:   Normocephalic, atraumatic, anicteric      Neck:  Supple, symmetrical, trachea midline   Lungs:   Clear to auscultation bilaterally; no rales, rhonchi or wheezing; respirations unlabored    Heart[de-identified]   Regular rate and rhythm; no murmur, rub, or gallop     Abdomen:   Soft, non-tender, non-distended; normal bowel sounds; no masses, no organomegaly    Genitalia:   Deferred    Rectal:   Deferred    Extremities:  No cyanosis, clubbing or edema    Pulses:  2+ and symmetric    Skin:  No jaundice, rashes, or lesions    Lymph nodes:  No palpable cervical lymphadenopathy        Lab Results:   No visits with results within 1 Day(s) from this visit  Latest known visit with results is:   Hospital Outpatient Visit on 11/08/2022   Component Date Value   • Case Report 11/08/2022                      Value:Surgical Pathology Report                         Case: [id number]                                   Authorizing Provider:  Marcial Thorne MD          Collected:           11/08/2022 1152              Ordering Location:     [address] [place] [address]        Received:            11/08/2022 [address] [place] [address],[address]                                                    Pathologist:           Raad Rodriguez MD                                                         Specimens:   A) - Polyp, Colorectal, cecal polyp-cold snare                                                      B) - Polyp, Colorectal, IC valve polyp bx's                                                         C) - Polyp, Colorectal, ascending polyp x4-cold snare                                     • Final Diagnosis 11/08/2022                      Value: This result contains rich text formatting which cannot be displayed here  • Additional Information 11/08/2022                      Value: This result contains rich text formatting which cannot be displayed here  • Synoptic Checklist 11/08/2022                      Value:                            COLON/RECTUM POLYP FORM - GI - All Specimens                                                                                     :    Adenoma(s)     • Gross Description 11/08/2022                      Value: This result contains rich text formatting which cannot be displayed here     • Clinical Information 11/08/2022                      Value:· One polyp measuring smaller than 5 mm in the cecum; removed en bloc by cold snare and retrieved specimen  · 35 mm sessile polyp in the ileocecal valve; performed partial removal by cold forceps biopsy; tattooed proximal to the finding with gil ink  Large laterally spreading polyp on the ileocecal valve/fold- could not be removed  Area was biopsied and tattooed  · Four sessile polyps measuring 5-9 mm in the ascending colon; completely removed en bloc by cold snare and retrieved specimen; placed 2 clips successfully (clips are MRI conditional)  · Few diverticula in the sigmoid colon  · Small, protruding, internal hemorrhoids         Radiology Results:   Colonoscopy    Result Date: 11/8/2022  Narrative: Rhonda Luther [address] [phone] DATE OF SERVICE: 11/08/22 PHYSICIAN(S): Attending: Mariano Rowland MD Fellow: No Staff Documented INDICATION: Positive colorectal cancer screening using Cologuard test POST-OP DIAGNOSIS: See the impression below  HISTORY: Prior colonoscopy: No prior colonoscopy  BOWEL PREPARATION: Miralax/Dulcolax PREPROCEDURE: Informed consent was obtained for the procedure, including sedation  Risks including but not limited to bleeding, infection, perforation, adverse drug reaction and aspiration were explained in detail  Also explained about less than 100% sensitivity with the exam and other alternatives  The patient was placed in the left lateral decubitus position  DETAILS OF PROCEDURE: Patient was taken to the procedure room where a time out was performed to confirm correct patient and correct procedure  The patient underwent monitored anesthesia care, which was administered by an anesthesia professional  The patient's blood pressure, heart rate, level of consciousness, oxygen and respirations were monitored throughout the procedure  A digital rectal exam was performed  The scope was introduced through the anus and advanced to the cecum  Retroflexion was performed in the rectum   The quality of bowel preparation was evaluated using the [facility] Bowel Preparation Scale with scores of: right colon = 2, transverse colon = 2, left colon = 2  The total BBPS score was 6  Bowel prep was adequate  The patient experienced no blood loss  The procedure was not difficult  The patient tolerated the procedure well  There were no apparent complications  ANESTHESIA INFORMATION: ASA: II Anesthesia Type: IV Sedation with Anesthesia MEDICATIONS: No administrations occurring from 1138 to 1218 on 11/08/22 FINDINGS: One polyp measuring smaller than 5 mm in the cecum; removed en bloc by cold snare and retrieved specimen 35 mm sessile polyp in the ileocecal valve; performed partial removal by cold forceps biopsy; tattooed proximal to the finding with gil ink  Large laterally spreading polyp on the ileocecal valve/fold- could not be removed  Area was biopsied and tattooed  Four sessile polyps measuring 5-9 mm in the ascending colon; completely removed en bloc by cold snare and retrieved specimen; placed 2 clips successfully (clips are MRI conditional) Few diverticula in the sigmoid colon Small, protruding, internal hemorrhoids EVENTS: Procedure Events Event Event Time ENDO CECUM REACHED 11/8/2022 11:52 AM ENDO SCOPE OUT TIME 11/8/2022 12:16 PM SPECIMENS: ID Type Source Tests Collected by Time Destination 1 : cecal polyp-cold snare Tissue Polyp, Colorectal TISSUE EXAM Alis Dean MD 11/8/2022 11:52 AM  2 : IC valve polyp bx's  Tissue Polyp, Colorectal TISSUE EXAM Alis Dean MD 11/8/2022 11:56 AM  3 : ascending polyp x4-cold snare Tissue Polyp, Colorectal TISSUE EXAM Alis Dean MD 11/8/2022 11:58 AM  EQUIPMENT: Colonoscope -[id number]     Impression: One polyp measuring smaller than 5 mm in the cecum; removed en bloc by cold snare and retrieved specimen 35 mm sessile polyp in the ileocecal valve; performed partial removal by cold forceps biopsy; tattooed proximal to the finding with gil ink   Large laterally spreading polyp on the ileocecal valve/fold- could not be removed  Area was biopsied and tattooed  Four sessile polyps measuring 5-9 mm in the ascending colon; completely removed en bloc by cold snare and retrieved specimen; placed 2 clips successfully (clips are MRI conditional) Few diverticula in the sigmoid colon Small, protruding, internal hemorrhoids RECOMMENDATION: Await pathology results Repeat colonoscopy in 3 months due to a personal history of colon polyps Large laterally spreading polyp, not removed- evaluate for resection Resume regular diet Discharge home If you have abdominal pain, bleeding, or fevers you are encouraged to contact our office at [phone]  If you do not hear from our office or your symptoms become severe, please go to your nearest ER for evaluation    Josh Lopez MD

## 2022-12-09 ENCOUNTER — HOSPITAL ENCOUNTER (OUTPATIENT)
Dept: GASTROENTEROLOGY | Facility: HOSPITAL | Age: 67
Setting detail: OUTPATIENT SURGERY
End: 2022-12-09
Attending: INTERNAL MEDICINE

## 2022-12-09 ENCOUNTER — ANESTHESIA (OUTPATIENT)
Dept: GASTROENTEROLOGY | Facility: HOSPITAL | Age: 67
End: 2022-12-09

## 2022-12-09 ENCOUNTER — ANESTHESIA EVENT (OUTPATIENT)
Dept: GASTROENTEROLOGY | Facility: HOSPITAL | Age: 67
End: 2022-12-09

## 2022-12-09 VITALS
DIASTOLIC BLOOD PRESSURE: 59 MMHG | HEART RATE: 64 BPM | SYSTOLIC BLOOD PRESSURE: 111 MMHG | RESPIRATION RATE: 16 BRPM | TEMPERATURE: 96 F | OXYGEN SATURATION: 97 %

## 2022-12-09 DIAGNOSIS — K63.5 POLYP OF COLON, UNSPECIFIED PART OF COLON, UNSPECIFIED TYPE: ICD-10-CM

## 2022-12-09 RX ORDER — PROPOFOL 10 MG/ML
INJECTION, EMULSION INTRAVENOUS AS NEEDED
Status: DISCONTINUED | OUTPATIENT
Start: 2022-12-09 | End: 2022-12-09

## 2022-12-09 RX ORDER — SODIUM CHLORIDE 9 MG/ML
INJECTION, SOLUTION INTRAVENOUS CONTINUOUS PRN
Status: DISCONTINUED | OUTPATIENT
Start: 2022-12-09 | End: 2022-12-09

## 2022-12-09 RX ORDER — LIDOCAINE HYDROCHLORIDE 10 MG/ML
INJECTION, SOLUTION EPIDURAL; INFILTRATION; INTRACAUDAL; PERINEURAL AS NEEDED
Status: DISCONTINUED | OUTPATIENT
Start: 2022-12-09 | End: 2022-12-09

## 2022-12-09 RX ADMIN — PROPOFOL 50 MG: 10 INJECTION, EMULSION INTRAVENOUS at 16:01

## 2022-12-09 RX ADMIN — PROPOFOL 50 MG: 10 INJECTION, EMULSION INTRAVENOUS at 15:30

## 2022-12-09 RX ADMIN — LIDOCAINE HYDROCHLORIDE 50 MG: 10 INJECTION, SOLUTION EPIDURAL; INFILTRATION; INTRACAUDAL; PERINEURAL at 15:27

## 2022-12-09 RX ADMIN — PROPOFOL 50 MG: 10 INJECTION, EMULSION INTRAVENOUS at 15:39

## 2022-12-09 RX ADMIN — PROPOFOL 50 MG: 10 INJECTION, EMULSION INTRAVENOUS at 15:47

## 2022-12-09 RX ADMIN — PROPOFOL 50 MG: 10 INJECTION, EMULSION INTRAVENOUS at 15:29

## 2022-12-09 RX ADMIN — PROPOFOL 50 MG: 10 INJECTION, EMULSION INTRAVENOUS at 15:38

## 2022-12-09 RX ADMIN — SODIUM CHLORIDE: 0.9 INJECTION, SOLUTION INTRAVENOUS at 15:18

## 2022-12-09 RX ADMIN — PROPOFOL 50 MG: 10 INJECTION, EMULSION INTRAVENOUS at 15:54

## 2022-12-09 RX ADMIN — PROPOFOL 100 MG: 10 INJECTION, EMULSION INTRAVENOUS at 15:27

## 2022-12-09 RX ADMIN — PROPOFOL 50 MG: 10 INJECTION, EMULSION INTRAVENOUS at 15:42

## 2022-12-09 RX ADMIN — PROPOFOL 50 MG: 10 INJECTION, EMULSION INTRAVENOUS at 15:32

## 2022-12-09 RX ADMIN — Medication 4 MG: at 15:34

## 2022-12-09 RX ADMIN — PROPOFOL 50 MG: 10 INJECTION, EMULSION INTRAVENOUS at 15:35

## 2022-12-09 NOTE — ANESTHESIA PREPROCEDURE EVALUATION
Procedure:  COLONOSCOPY    Relevant Problems   ANESTHESIA (within normal limits)      CARDIO   (+) Mitral valve disorder      MUSCULOSKELETAL   (+) Low back pain      Digestive   (+) Adenomatous polyp of cecum      Nervous and Auditory   (+) Lumbar radiculopathy      Musculoskeletal and Integument   (+) Displacement of lumbar intervertebral disc without myelopathy        Physical Exam    Airway    Mallampati score: II  TM Distance: >3 FB  Neck ROM: full     Dental   No notable dental hx     Cardiovascular  Rhythm: regular, Rate: normal, Cardiovascular exam normal    Pulmonary  Pulmonary exam normal Breath sounds clear to auscultation,     Other Findings        Anesthesia Plan  ASA Score- 2     Anesthesia Type- IV sedation with anesthesia with ASA Monitors  Additional Monitors:   Airway Plan:           Plan Factors-Exercise tolerance (METS): >4 METS  Chart reviewed  Patient summary reviewed  Induction-     Postoperative Plan-     Informed Consent- Anesthetic plan and risks discussed with patient  I personally reviewed this patient with the CRNA  Discussed and agreed on the Anesthesia Plan with the CRNA  Shelly Marks

## 2022-12-09 NOTE — ANESTHESIA POSTPROCEDURE EVALUATION
Post-Op Assessment Note    CV Status:  Stable    Pain management: adequate     Mental Status:  Awake and sleepy   Hydration Status:  Euvolemic   PONV Controlled:  Controlled   Airway Patency:  Patent      Post Op Vitals Reviewed: Yes      Staff: CRNA, Anesthesiologist         No notable events documented      /57 (12/09/22 1608)    Temp (!) 96 °F (35 6 °C) (12/09/22 1608)    Pulse 70 (12/09/22 1608)   Resp 16 (12/09/22 1608)    SpO2 98 % (12/09/22 1608)

## 2022-12-22 ENCOUNTER — HOSPITAL ENCOUNTER (OUTPATIENT)
Dept: RADIOLOGY | Age: 67
Discharge: HOME | End: 2022-12-22

## 2022-12-22 VITALS — WEIGHT: 162 LBS | BODY MASS INDEX: 26.03 KG/M2 | HEIGHT: 66 IN

## 2022-12-22 DIAGNOSIS — Z12.31 ENCOUNTER FOR SCREENING MAMMOGRAM FOR MALIGNANT NEOPLASM OF BREAST: ICD-10-CM

## 2023-01-12 ENCOUNTER — TELEPHONE (OUTPATIENT)
Dept: OTHER | Facility: OTHER | Age: 68
End: 2023-01-12

## 2023-01-12 NOTE — TELEPHONE ENCOUNTER
Patient had colonoscopy with Dr Luigi Dakin on 12/9; patient was told she was supposed to have another one in 6 months  Patient wanted to schedule her colonoscopy; she is hoping to get it scheduled with Dr Luigi Dakin again

## 2023-02-16 ENCOUNTER — TELEPHONE (OUTPATIENT)
Dept: GASTROENTEROLOGY | Facility: CLINIC | Age: 68
End: 2023-02-16

## 2023-02-16 NOTE — TELEPHONE ENCOUNTER
Patients GI provider:  Dr Danny Chisholm    Number to return call: [phone]    Reason for call: Pt calling because she has a colon recall in April  She would prefer to have the procedure In June with Dr Danny Chisholm at [place]  He is scheduled in room 4       Scheduled procedure/appointment date if applicable: N/A

## 2023-02-20 ENCOUNTER — PREP FOR PROCEDURE (OUTPATIENT)
Dept: GASTROENTEROLOGY | Facility: MEDICAL CENTER | Age: 68
End: 2023-02-20

## 2023-02-20 DIAGNOSIS — D12.0 ADENOMATOUS POLYP OF CECUM: Primary | ICD-10-CM

## 2023-02-20 DIAGNOSIS — D12.6 TUBULAR ADENOMA OF COLON: ICD-10-CM

## 2023-02-20 DIAGNOSIS — K63.5 POLYP OF COLON, UNSPECIFIED PART OF COLON, UNSPECIFIED TYPE: ICD-10-CM

## 2023-02-20 NOTE — TELEPHONE ENCOUNTER
Spoke to patient and scheduled repeat colon in 6m with Dr Yvonne Bell @ [facility]    Last colon: 12/9/2022      Scheduled date of Colonoscopy (as of today) 06/13/2023  Physician performing: Dr Yvonne Bell  Location of procedure:  [facility]  Instructions given to patient:  Miralax/Dulclax preferred by patient  Clearances: n/a

## 2023-06-12 ENCOUNTER — TELEPHONE (OUTPATIENT)
Dept: GASTROENTEROLOGY | Facility: MEDICAL CENTER | Age: 68
End: 2023-06-12

## 2023-06-12 RX ORDER — SODIUM CHLORIDE, SODIUM LACTATE, POTASSIUM CHLORIDE, CALCIUM CHLORIDE 600; 310; 30; 20 MG/100ML; MG/100ML; MG/100ML; MG/100ML
125 INJECTION, SOLUTION INTRAVENOUS CONTINUOUS
Status: CANCELLED | OUTPATIENT
Start: 2023-06-12

## 2023-06-12 RX ORDER — LIDOCAINE HYDROCHLORIDE 10 MG/ML
0.5 INJECTION, SOLUTION EPIDURAL; INFILTRATION; INTRACAUDAL; PERINEURAL ONCE AS NEEDED
Status: CANCELLED | OUTPATIENT
Start: 2023-06-12

## 2023-06-12 NOTE — TELEPHONE ENCOUNTER
Left voice message for patient confirming upcoming procedure  Patient was instructed to call [phone] if they have any questions or concerns about the prep instructions or if they need to change or cancel the procedure

## 2023-06-13 ENCOUNTER — HOSPITAL ENCOUNTER (OUTPATIENT)
Dept: GASTROENTEROLOGY | Facility: HOSPITAL | Age: 68
Setting detail: OUTPATIENT SURGERY
Discharge: HOME | End: 2023-06-13
Attending: INTERNAL MEDICINE
Payer: MEDICARE

## 2023-06-13 ENCOUNTER — ANESTHESIA EVENT (OUTPATIENT)
Dept: GASTROENTEROLOGY | Facility: HOSPITAL | Age: 68
End: 2023-06-13

## 2023-06-13 ENCOUNTER — ANESTHESIA (OUTPATIENT)
Dept: GASTROENTEROLOGY | Facility: HOSPITAL | Age: 68
End: 2023-06-13

## 2023-06-13 VITALS
DIASTOLIC BLOOD PRESSURE: 58 MMHG | WEIGHT: 168 LBS | RESPIRATION RATE: 18 BRPM | OXYGEN SATURATION: 96 % | BODY MASS INDEX: 27.12 KG/M2 | HEART RATE: 66 BPM | TEMPERATURE: 96.8 F | SYSTOLIC BLOOD PRESSURE: 119 MMHG

## 2023-06-13 DIAGNOSIS — D12.6 TUBULAR ADENOMA OF COLON: ICD-10-CM

## 2023-06-13 DIAGNOSIS — D12.0 ADENOMATOUS POLYP OF CECUM: ICD-10-CM

## 2023-06-13 DIAGNOSIS — K63.5 POLYP OF COLON, UNSPECIFIED PART OF COLON, UNSPECIFIED TYPE: ICD-10-CM

## 2023-06-13 PROCEDURE — 88305 TISSUE EXAM BY PATHOLOGIST: CPT | Performed by: PATHOLOGY

## 2023-06-13 RX ORDER — PROPOFOL 10 MG/ML
INJECTION, EMULSION INTRAVENOUS AS NEEDED
Status: DISCONTINUED | OUTPATIENT
Start: 2023-06-13 | End: 2023-06-13

## 2023-06-13 RX ORDER — SODIUM CHLORIDE 9 MG/ML
INJECTION, SOLUTION INTRAVENOUS CONTINUOUS PRN
Status: DISCONTINUED | OUTPATIENT
Start: 2023-06-13 | End: 2023-06-13

## 2023-06-13 RX ORDER — SIMETHICONE 20 MG/.3ML
EMULSION ORAL AS NEEDED
Status: COMPLETED | OUTPATIENT
Start: 2023-06-13 | End: 2023-06-13

## 2023-06-13 RX ADMIN — PROPOFOL 25 MG: 10 INJECTION, EMULSION INTRAVENOUS at 13:37

## 2023-06-13 RX ADMIN — PROPOFOL 25 MG: 10 INJECTION, EMULSION INTRAVENOUS at 13:45

## 2023-06-13 RX ADMIN — PROPOFOL 25 MG: 10 INJECTION, EMULSION INTRAVENOUS at 13:35

## 2023-06-13 RX ADMIN — PROPOFOL 25 MG: 10 INJECTION, EMULSION INTRAVENOUS at 13:43

## 2023-06-13 RX ADMIN — PROPOFOL 50 MG: 10 INJECTION, EMULSION INTRAVENOUS at 13:27

## 2023-06-13 RX ADMIN — PROPOFOL 25 MG: 10 INJECTION, EMULSION INTRAVENOUS at 13:32

## 2023-06-13 RX ADMIN — PROPOFOL 25 MG: 10 INJECTION, EMULSION INTRAVENOUS at 13:41

## 2023-06-13 RX ADMIN — PROPOFOL 25 MG: 10 INJECTION, EMULSION INTRAVENOUS at 13:39

## 2023-06-13 RX ADMIN — PROPOFOL 25 MG: 10 INJECTION, EMULSION INTRAVENOUS at 13:29

## 2023-06-13 RX ADMIN — PROPOFOL 25 MG: 10 INJECTION, EMULSION INTRAVENOUS at 13:33

## 2023-06-13 RX ADMIN — PROPOFOL 25 MG: 10 INJECTION, EMULSION INTRAVENOUS at 13:51

## 2023-06-13 RX ADMIN — SODIUM CHLORIDE: 0.9 INJECTION, SOLUTION INTRAVENOUS at 12:45

## 2023-06-13 RX ADMIN — PROPOFOL 25 MG: 10 INJECTION, EMULSION INTRAVENOUS at 13:49

## 2023-06-13 RX ADMIN — PROPOFOL 25 MG: 10 INJECTION, EMULSION INTRAVENOUS at 13:31

## 2023-06-13 RX ADMIN — PROPOFOL 25 MG: 10 INJECTION, EMULSION INTRAVENOUS at 13:30

## 2023-06-13 RX ADMIN — Medication 40 MG: at 13:34

## 2023-06-13 RX ADMIN — PROPOFOL 25 MG: 10 INJECTION, EMULSION INTRAVENOUS at 13:47

## 2023-06-13 RX ADMIN — PROPOFOL 25 MG: 10 INJECTION, EMULSION INTRAVENOUS at 13:53

## 2023-06-13 RX ADMIN — PROPOFOL 25 MG: 10 INJECTION, EMULSION INTRAVENOUS at 13:28

## 2023-06-13 RX ADMIN — PROPOFOL 50 MG: 10 INJECTION, EMULSION INTRAVENOUS at 13:26

## 2023-06-13 RX ADMIN — PROPOFOL 20 MG: 10 INJECTION, EMULSION INTRAVENOUS at 13:55

## 2023-06-13 NOTE — ANESTHESIA POSTPROCEDURE EVALUATION
Post-Op Assessment Note    CV Status:  Stable  Pain Score: 0    Pain management: adequate     Mental Status:  Alert and awake   Hydration Status:  Euvolemic   PONV Controlled:  Controlled   Airway Patency:  Patent      Post Op Vitals Reviewed: Yes      Staff: Anesthesiologist, CRNA         No notable events documented      BP   110/55   Temp   96 8   Pulse  71   Resp   16   SpO2   94

## 2023-06-13 NOTE — ANESTHESIA PREPROCEDURE EVALUATION
Procedure:  COLONOSCOPY    Relevant Problems   CARDIO   (+) Mitral valve disorder      MUSCULOSKELETAL   (+) Low back pain        Physical Exam    Airway    Mallampati score: II  TM Distance: >3 FB  Neck ROM: full     Dental   No notable dental hx     Cardiovascular  Rhythm: regular, Rate: normal, Cardiovascular exam normal    Pulmonary  Pulmonary exam normal Breath sounds clear to auscultation,     Other Findings        Anesthesia Plan  ASA Score- 2     Anesthesia Type- IV sedation with anesthesia with ASA Monitors  Additional Monitors:   Airway Plan:     Comment: Discussed risks/benefits, including medication reactions, awareness, aspiration, and serious/life threatening complications  Plan to maintain native airway with IVGA, monitored with EtCO2  Plan Factors-Exercise tolerance (METS): >4 METS  Patient summary reviewed  Patient instructed to abstain from smoking on day of procedure  Patient did not smoke on day of surgery  Induction- intravenous  Postoperative Plan-     Informed Consent- Anesthetic plan and risks discussed with patient  I personally reviewed this patient with the CRNA  Discussed and agreed on the Anesthesia Plan with the CRNA  Viridiana Ware

## 2023-06-13 NOTE — H&P
"History and Physical -  [facility]  Monica Benjamin 75 y o  female MRN: [id number]    HPI: Monica Benjamin is a 75y o  year old female who presents with h/o colon polyp - s/p EMR         Review of Systems    Historical Information   Past Medical History:   Diagnosis Date   • Arthritis    • Heart murmur    • Hyperlipemia    • Skin cancer     all 3 types     Past Surgical History:   Procedure Laterality Date   • BREAST EXCISIONAL BIOPSY Left     approximately    •  SECTION      x2   • DILATION AND CURETTAGE OF UTERUS     • LAMINECTOMY     • SKIN CANCER EXCISION     • WISDOM TOOTH EXTRACTION       Social History   Social History     Substance and Sexual Activity   Alcohol Use Yes    Comment: occasionally     Social History     Substance and Sexual Activity   Drug Use Not Currently     Social History     Tobacco Use   Smoking Status Never   Smokeless Tobacco Never     Family History   Problem Relation Age of Onset   • No Known Problems Mother    • No Known Problems Father    • No Known Problems Daughter    • No Known Problems Maternal Grandmother    • Prostate cancer Maternal Grandfather [de-identified]   • Leukemia Maternal Grandfather [de-identified]   • No Known Problems Paternal Grandmother    • No Known Problems Paternal Grandfather    • No Known Problems Son    • No Known Problems Maternal Aunt    • No Known Problems Maternal Aunt    • No Known Problems Paternal Aunt    • No Known Problems Paternal [de-identified]    • Lymphoma Brother 28       Meds/Allergies     (Not in a hospital admission)      Allergies   Allergen Reactions   • Other Other (See Comments)     macrofurodantin--pt gets violently ill   • Medical Tape Other (See Comments)     \"skin burns\"   • Sulfa Antibiotics Rash       Objective     /80   Pulse 66   Temp 97 8 °F (36 6 °C) (Tympanic)   Resp 18   Wt 76 2 kg (168 lb)   SpO2 95%   BMI 27 12 kg/m²       PHYSICAL EXAM    Gen: NAD  CV: RRR  CHEST: Clear  ABD: soft, NT/ND  EXT: no edema  Neuro: " AAO      ASSESSMENT/PLAN:  This is a 75y o  year old female here for colonoscopy for follow up of colon EMR>     PLAN:   Procedure: Colonoscopy

## 2023-06-16 PROCEDURE — 88305 TISSUE EXAM BY PATHOLOGIST: CPT | Performed by: PATHOLOGY

## 2023-06-20 NOTE — RESULT ENCOUNTER NOTE
Inform patient via [employer]  Please review the pathology/lab result of further discussion  Copied from [employer] message :       Adelaide Hazel,     Your colon polyp was benign but precancerous  The site of the prior polyp removal also appeared to be unremarkable  Only mild inflammation was seen  I would recommend repeat colonoscopy in 3 years      Best regards,     Shantel Castellanos MD

## 2024-01-04 ENCOUNTER — APPOINTMENT (EMERGENCY)
Dept: RADIOLOGY | Facility: HOSPITAL | Age: 69
DRG: 309 | End: 2024-01-04
Payer: MEDICARE

## 2024-01-04 ENCOUNTER — HOSPITAL ENCOUNTER (INPATIENT)
Facility: HOSPITAL | Age: 69
LOS: 1 days | Discharge: HOME | DRG: 309 | End: 2024-01-05
Attending: EMERGENCY MEDICINE | Admitting: INTERNAL MEDICINE
Payer: MEDICARE

## 2024-01-04 DIAGNOSIS — R07.9 CHEST PAIN: ICD-10-CM

## 2024-01-04 DIAGNOSIS — I48.91 ATRIAL FIBRILLATION WITH RAPID VENTRICULAR RESPONSE (HCC): Primary | ICD-10-CM

## 2024-01-04 DIAGNOSIS — J06.9 VIRAL URI: ICD-10-CM

## 2024-01-04 DIAGNOSIS — I48.91 NEW ONSET A-FIB (HCC): ICD-10-CM

## 2024-01-04 DIAGNOSIS — D64.9 ANEMIA, UNSPECIFIED TYPE: ICD-10-CM

## 2024-01-04 PROBLEM — M06.9 RHEUMATOID ARTHRITIS (HCC): Status: ACTIVE | Noted: 2024-01-04

## 2024-01-04 PROBLEM — I01.1 RHEUMATOID AORTITIS: Status: ACTIVE | Noted: 2024-01-04

## 2024-01-04 PROBLEM — E78.5 HYPERLIPIDEMIA: Status: ACTIVE | Noted: 2024-01-04

## 2024-01-04 PROBLEM — D72.829 LEUKOCYTOSIS: Status: ACTIVE | Noted: 2024-01-04

## 2024-01-04 LAB
2HR DELTA HS TROPONIN: -12 NG/L
ALBUMIN SERPL BCP-MCNC: 3.3 G/DL (ref 3.5–5)
ALP SERPL-CCNC: 146 U/L (ref 34–104)
ALT SERPL W P-5'-P-CCNC: 25 U/L (ref 7–52)
ANION GAP SERPL CALCULATED.3IONS-SCNC: 7 MMOL/L
APTT PPP: 29 SECONDS (ref 23–37)
AST SERPL W P-5'-P-CCNC: 15 U/L (ref 13–39)
ATRIAL RATE: 375 BPM
ATRIAL RATE: 94 BPM
BASOPHILS # BLD AUTO: 0.09 THOUSANDS/ÂΜL (ref 0–0.1)
BASOPHILS NFR BLD AUTO: 1 % (ref 0–1)
BILIRUB SERPL-MCNC: 0.41 MG/DL (ref 0.2–1)
BUN SERPL-MCNC: 17 MG/DL (ref 5–25)
CALCIUM ALBUM COR SERPL-MCNC: 9.4 MG/DL (ref 8.3–10.1)
CALCIUM SERPL-MCNC: 8.8 MG/DL (ref 8.4–10.2)
CARDIAC TROPONIN I PNL SERPL HS: 15 NG/L
CARDIAC TROPONIN I PNL SERPL HS: 3 NG/L
CHLORIDE SERPL-SCNC: 107 MMOL/L (ref 96–108)
CO2 SERPL-SCNC: 26 MMOL/L (ref 21–32)
CREAT SERPL-MCNC: 0.69 MG/DL (ref 0.6–1.3)
EOSINOPHIL # BLD AUTO: 0.47 THOUSAND/ÂΜL (ref 0–0.61)
EOSINOPHIL NFR BLD AUTO: 4 % (ref 0–6)
ERYTHROCYTE [DISTWIDTH] IN BLOOD BY AUTOMATED COUNT: 12.3 % (ref 11.6–15.1)
ERYTHROCYTE [DISTWIDTH] IN BLOOD BY AUTOMATED COUNT: 12.3 % (ref 11.6–15.1)
GFR SERPL CREATININE-BSD FRML MDRD: 89 ML/MIN/1.73SQ M
GLUCOSE SERPL-MCNC: 101 MG/DL (ref 65–140)
HCT VFR BLD AUTO: 34.4 % (ref 34.8–46.1)
HCT VFR BLD AUTO: 36.1 % (ref 34.8–46.1)
HGB BLD-MCNC: 11.4 G/DL (ref 11.5–15.4)
HGB BLD-MCNC: 11.6 G/DL (ref 11.5–15.4)
IMM GRANULOCYTES # BLD AUTO: 0.06 THOUSAND/UL (ref 0–0.2)
IMM GRANULOCYTES NFR BLD AUTO: 1 % (ref 0–2)
INR PPP: 1.01 (ref 0.84–1.19)
INR PPP: 1.07 (ref 0.84–1.19)
LYMPHOCYTES # BLD AUTO: 1.69 THOUSANDS/ÂΜL (ref 0.6–4.47)
LYMPHOCYTES NFR BLD AUTO: 16 % (ref 14–44)
MCH RBC QN AUTO: 30.6 PG (ref 26.8–34.3)
MCH RBC QN AUTO: 31.1 PG (ref 26.8–34.3)
MCHC RBC AUTO-ENTMCNC: 32.1 G/DL (ref 31.4–37.4)
MCHC RBC AUTO-ENTMCNC: 33.1 G/DL (ref 31.4–37.4)
MCV RBC AUTO: 94 FL (ref 82–98)
MCV RBC AUTO: 95 FL (ref 82–98)
MONOCYTES # BLD AUTO: 0.52 THOUSAND/ÂΜL (ref 0.17–1.22)
MONOCYTES NFR BLD AUTO: 5 % (ref 4–12)
NEUTROPHILS # BLD AUTO: 8.02 THOUSANDS/ÂΜL (ref 1.85–7.62)
NEUTS SEG NFR BLD AUTO: 73 % (ref 43–75)
NRBC BLD AUTO-RTO: 0 /100 WBCS
P AXIS: 0 DEGREES
PLATELET # BLD AUTO: 390 THOUSANDS/UL (ref 149–390)
PLATELET # BLD AUTO: 408 THOUSANDS/UL (ref 149–390)
PMV BLD AUTO: 8.5 FL (ref 8.9–12.7)
PMV BLD AUTO: 8.5 FL (ref 8.9–12.7)
POTASSIUM SERPL-SCNC: 3.7 MMOL/L (ref 3.5–5.3)
PROT SERPL-MCNC: 6.6 G/DL (ref 6.4–8.4)
PROTHROMBIN TIME: 13.2 SECONDS (ref 11.6–14.5)
PROTHROMBIN TIME: 13.8 SECONDS (ref 11.6–14.5)
QRS AXIS: 55 DEGREES
QRS AXIS: 62 DEGREES
QRSD INTERVAL: 84 MS
QRSD INTERVAL: 84 MS
QT INTERVAL: 296 MS
QT INTERVAL: 354 MS
QTC INTERVAL: 389 MS
QTC INTERVAL: 408 MS
RBC # BLD AUTO: 3.67 MILLION/UL (ref 3.81–5.12)
RBC # BLD AUTO: 3.79 MILLION/UL (ref 3.81–5.12)
SODIUM SERPL-SCNC: 140 MMOL/L (ref 135–147)
T WAVE AXIS: 44 DEGREES
T WAVE AXIS: 53 DEGREES
TSH SERPL DL<=0.05 MIU/L-ACNC: 1.46 UIU/ML (ref 0.45–4.5)
VENTRICULAR RATE: 104 BPM
VENTRICULAR RATE: 80 BPM
WBC # BLD AUTO: 10.62 THOUSAND/UL (ref 4.31–10.16)
WBC # BLD AUTO: 10.85 THOUSAND/UL (ref 4.31–10.16)

## 2024-01-04 PROCEDURE — 85610 PROTHROMBIN TIME: CPT | Performed by: EMERGENCY MEDICINE

## 2024-01-04 PROCEDURE — 96365 THER/PROPH/DIAG IV INF INIT: CPT

## 2024-01-04 PROCEDURE — 81001 URINALYSIS AUTO W/SCOPE: CPT

## 2024-01-04 PROCEDURE — 84443 ASSAY THYROID STIM HORMONE: CPT | Performed by: INTERNAL MEDICINE

## 2024-01-04 PROCEDURE — 99285 EMERGENCY DEPT VISIT HI MDM: CPT

## 2024-01-04 PROCEDURE — 71045 X-RAY EXAM CHEST 1 VIEW: CPT

## 2024-01-04 PROCEDURE — 99223 1ST HOSP IP/OBS HIGH 75: CPT | Performed by: INTERNAL MEDICINE

## 2024-01-04 PROCEDURE — 36415 COLL VENOUS BLD VENIPUNCTURE: CPT | Performed by: EMERGENCY MEDICINE

## 2024-01-04 PROCEDURE — 85027 COMPLETE CBC AUTOMATED: CPT | Performed by: INTERNAL MEDICINE

## 2024-01-04 PROCEDURE — 99291 CRITICAL CARE FIRST HOUR: CPT | Performed by: EMERGENCY MEDICINE

## 2024-01-04 PROCEDURE — 80053 COMPREHEN METABOLIC PANEL: CPT | Performed by: EMERGENCY MEDICINE

## 2024-01-04 PROCEDURE — 96376 TX/PRO/DX INJ SAME DRUG ADON: CPT

## 2024-01-04 PROCEDURE — 84484 ASSAY OF TROPONIN QUANT: CPT | Performed by: EMERGENCY MEDICINE

## 2024-01-04 PROCEDURE — 93005 ELECTROCARDIOGRAM TRACING: CPT

## 2024-01-04 PROCEDURE — 85730 THROMBOPLASTIN TIME PARTIAL: CPT | Performed by: INTERNAL MEDICINE

## 2024-01-04 PROCEDURE — 85610 PROTHROMBIN TIME: CPT | Performed by: INTERNAL MEDICINE

## 2024-01-04 PROCEDURE — 85025 COMPLETE CBC W/AUTO DIFF WBC: CPT | Performed by: EMERGENCY MEDICINE

## 2024-01-04 RX ORDER — CEFPODOXIME PROXETIL 200 MG/1
200 TABLET, FILM COATED ORAL 2 TIMES DAILY WITH MEALS
Qty: 6 TABLET | Refills: 0 | Status: DISCONTINUED | OUTPATIENT
Start: 2024-01-05 | End: 2024-01-05 | Stop reason: HOSPADM

## 2024-01-04 RX ORDER — HEPARIN SODIUM 1000 [USP'U]/ML
4000 INJECTION, SOLUTION INTRAVENOUS; SUBCUTANEOUS ONCE
Qty: 10 ML | Refills: 0 | Status: COMPLETED | OUTPATIENT
Start: 2024-01-05 | End: 2024-01-05

## 2024-01-04 RX ORDER — METOPROLOL TARTRATE 50 MG/1
50 TABLET, FILM COATED ORAL EVERY 12 HOURS SCHEDULED
Status: DISCONTINUED | OUTPATIENT
Start: 2024-01-04 | End: 2024-01-05 | Stop reason: HOSPADM

## 2024-01-04 RX ORDER — SODIUM CHLORIDE 9 MG/ML
3 INJECTION INTRAVENOUS
Status: DISCONTINUED | OUTPATIENT
Start: 2024-01-04 | End: 2024-01-05 | Stop reason: HOSPADM

## 2024-01-04 RX ORDER — FOLIC ACID 1 MG/1
1000 TABLET ORAL EVERY MORNING
Status: DISCONTINUED | OUTPATIENT
Start: 2024-01-04 | End: 2024-01-05 | Stop reason: HOSPADM

## 2024-01-04 RX ORDER — HEPARIN SODIUM 1000 [USP'U]/ML
2000 INJECTION, SOLUTION INTRAVENOUS; SUBCUTANEOUS EVERY 6 HOURS PRN
Status: DISCONTINUED | OUTPATIENT
Start: 2024-01-05 | End: 2024-01-05

## 2024-01-04 RX ORDER — ATORVASTATIN CALCIUM 10 MG/1
10 TABLET, FILM COATED ORAL
Status: DISCONTINUED | OUTPATIENT
Start: 2024-01-04 | End: 2024-01-05 | Stop reason: HOSPADM

## 2024-01-04 RX ORDER — HEPARIN SODIUM 1000 [USP'U]/ML
4000 INJECTION, SOLUTION INTRAVENOUS; SUBCUTANEOUS EVERY 6 HOURS PRN
Status: DISCONTINUED | OUTPATIENT
Start: 2024-01-05 | End: 2024-01-05

## 2024-01-04 RX ORDER — HEPARIN SODIUM 10000 [USP'U]/100ML
3-20 INJECTION, SOLUTION INTRAVENOUS
Status: DISCONTINUED | OUTPATIENT
Start: 2024-01-05 | End: 2024-01-05

## 2024-01-04 RX ORDER — DILTIAZEM HYDROCHLORIDE 5 MG/ML
15 INJECTION INTRAVENOUS ONCE
Status: COMPLETED | OUTPATIENT
Start: 2024-01-04 | End: 2024-01-04

## 2024-01-04 RX ORDER — ACETAMINOPHEN 325 MG/1
650 TABLET ORAL EVERY 6 HOURS PRN
Status: DISCONTINUED | OUTPATIENT
Start: 2024-01-04 | End: 2024-01-05 | Stop reason: HOSPADM

## 2024-01-04 RX ADMIN — METOPROLOL TARTRATE 50 MG: 50 TABLET ORAL at 21:10

## 2024-01-04 RX ADMIN — DILTIAZEM HYDROCHLORIDE 15 MG: 5 INJECTION INTRAVENOUS at 09:18

## 2024-01-04 RX ADMIN — DILTIAZEM HYDROCHLORIDE 10 MG/HR: 5 INJECTION INTRAVENOUS at 09:19

## 2024-01-04 RX ADMIN — ATORVASTATIN CALCIUM 10 MG: 10 TABLET, FILM COATED ORAL at 15:41

## 2024-01-04 RX ADMIN — APIXABAN 5 MG: 5 TABLET, FILM COATED ORAL at 13:37

## 2024-01-04 RX ADMIN — ACETAMINOPHEN 650 MG: 325 TABLET, FILM COATED ORAL at 14:34

## 2024-01-04 RX ADMIN — ACETAMINOPHEN 650 MG: 325 TABLET, FILM COATED ORAL at 20:29

## 2024-01-04 RX ADMIN — FOLIC ACID 1000 MCG: 1 TABLET ORAL at 15:41

## 2024-01-04 RX ADMIN — METOPROLOL TARTRATE 50 MG: 50 TABLET ORAL at 13:37

## 2024-01-04 NOTE — CONSULTS
Consultation - Cardiology Team 1  Angi Souza 68 y.o. female MRN: [id number]  Unit/Bed#: ED 20 Encounter: [id number]    Assessment/Plan     Principal Problem:    New onset a-fib (HCC)  Active Problems:    Mitral valve disorder    Leukocytosis    Hyperlipidemia      Assessment  Atrial fibrillation  New diagnosis  Presents with RVR-heart rate 100-140's  Associated with palpitations, chest tightness, shortness of breath and fatigue  S/p IV cardizem bolus 15 mg followed by infusion, currently at 10 mg.  Heart rate 80s  0-hour troponin 15 with 2-hour troponin 3.  No evidence of ACS.  [id number] equals 2  CXR clear   BMP normal  Reported history of MVP  Reports remote palpitations- on pindolol. No echo for review.   HLD-patient takes atorvastatin 40 mg daily  Nephrolithiasis-status post cystoscopy with laser lithotripsy and stone extraction with stent  Exchange 12/27. Plans for second stage right ureteroscopy with possible balloon dilatation  Mild leukocytosis  Reports resolved hematuria  Reports resolved pain  Rheumatoid arthritis - on methotrexate    Plan  Would rate control for now. Will start lopressor 50mg every 12 hours. Would start on AC if no contraindication- eliquis 5mg BID.   Once able to AC for 4 weeks without interruption would plan TANIA/DCCV  Check TTE for LV function, wall motion, valvular heart disease  Would consider outpatient stress testing.   Check TSH  Telemetry     History of Present Illness   Physician Requesting Consult: Malika Zuniga MD  Reason for Consult / Principal Problem: atrial fibrillation    HPI: Angi Souza is a 68 y.o. year old female with RA ( on methotrexate) HLD, nephrolithiasis s/p cystoscopy with laser lithotripsy and stone extraction with stent exchange 12/27 , remote DVT who presents with chest heaviness associated with palpitations.  She noted she was unable to get comfort last night in bed.  She had chest tightness.  She was also feeling short of breath more so when  "she would lie down.  Over the past 2 weeks she has noted palpitations, dyspnea, headaches, fatigue.  She has been dealing with a lot of pain from urology source and under treatment at [facility].  At this time her pain in that regard is under good control. She anticipates further urological intervention January 17th at [facility]. No history of exertional chest pain.     Patient reports a history of mitral valve prolapse diagnosed 35 years ago.  No echocardiogram done at that time.  Reportedly is on pindolol 2-1/2 mg twice daily.  History of palpitations however not similar to what she is currently experiencing.  She does not follow cardiology.    She describes herself as \"active\".  More recently however , since Thanksgiving she has been having urological issues and has been more sedentary.  Reports gross hematuria at that time.  She describes an intense amount of pain and relation that has fortunately subsided.  She underwent cystoscopy with ureteral stent 11/26 , cystoscopy with ureteroscopy with lithotripsy 12/15 . She reports having difficulty with some of her medications including nausea and decreased appetite . She reports her hematuria has subsided since last urology procedure 12/27.  She remains on an antibiotic.    Denies tobacco abuse.  Rare alcohol use. Mother and father with hypertension.  Mother had valve surgery, unknown details.    Inpatient consult to Cardiology  Consult performed by: HOSEA Patel  Consult ordered by: Bon Multani DO          Review of Systems   Constitutional:  Positive for activity change and fatigue.   HENT:  Positive for congestion.    Eyes: Negative.    Respiratory:  Positive for cough and shortness of breath.    Cardiovascular:  Positive for chest pain and palpitations.   Gastrointestinal:  Positive for nausea.   Endocrine: Negative.    Genitourinary:  Positive for hematuria.   Musculoskeletal: Negative.    Allergic/Immunologic: Negative.    Neurological: Negative.  " "  Hematological: Negative.    Psychiatric/Behavioral: Negative.     All other systems reviewed and are negative.      Historical Information   Past Medical History:   Diagnosis Date    Arthritis     Heart murmur     Hyperlipemia     Skin cancer     all 3 types     Past Surgical History:   Procedure Laterality Date    BREAST EXCISIONAL BIOPSY Left     approximately      SECTION      x2    DILATION AND CURETTAGE OF UTERUS      LAMINECTOMY      SKIN CANCER EXCISION      WISDOM TOOTH EXTRACTION       Social History     Substance and Sexual Activity   Alcohol Use Yes    Comment: occasionally     Social History     Substance and Sexual Activity   Drug Use Not Currently     Social History     Tobacco Use   Smoking Status Never   Smokeless Tobacco Never     Family History:   Family History   Problem Relation Age of Onset    No Known Problems Mother     No Known Problems Father     No Known Problems Daughter     No Known Problems Maternal Grandmother     Prostate cancer Maternal Grandfather 80    Leukemia Maternal Grandfather 80    No Known Problems Paternal Grandmother     No Known Problems Paternal Grandfather     No Known Problems Son     No Known Problems Maternal Aunt     No Known Problems Maternal Aunt     No Known Problems Paternal Aunt     No Known Problems Paternal Aunt     Lymphoma Brother 35       Meds/Allergies   current meds:   Current Facility-Administered Medications   Medication Dose Route Frequency    diltiazem (CARDIZEM) 125 mg in sodium chloride 0.9 % 125 mL infusion  1-15 mg/hr Intravenous Titrated    sodium chloride (PF) 0.9 % injection 3 mL  3 mL Intravenous Q1H PRN    and PTA meds:  (Not in a hospital admission)    Allergies   Allergen Reactions    Other Other (See Comments)     macrofurodantin--pt gets violently ill    Medical Tape Other (See Comments)     \"skin burns\"    Sulfa Antibiotics Rash       Objective   Vitals: Blood pressure 121/67, pulse 92, temperature 99.2 °F (37.3 °C), " temperature source Tympanic, resp. rate 17, SpO2 97%, not currently breastfeeding.  Orthostatic Blood Pressures      Flowsheet Row Most Recent Value   Blood Pressure 121/67 filed at 01/04/2024 0930   Patient Position - Orthostatic VS Lying filed at 01/04/2024 0812            No intake or output data in the 24 hours ending 01/04/24 1041    Invasive Devices       Peripheral Intravenous Line  Duration             Peripheral IV 01/04/24 Left Antecubital <1 day                    Physical Exam: /67   Pulse 92   Temp 99.2 °F (37.3 °C) (Tympanic)   Resp 17   SpO2 97%   General Appearance:    Alert, cooperative, no distress, appears stated age   Head:    Normocephalic, no scleral icterus   Eyes:    PERRL   Nose:   Nares normal, septum midline, mucosa normal, no drainage    Throat:   Lips, mucosa, and tongue normal   Neck:   Supple, symmetrical, trachea midline     no JVD       Lungs:     Clear to auscultation bilaterally, respirations unlabored        Heart:    Irregular rate and rhythm, S1 and S2 normal, no murmur, rub   or gallop   Abdomen:     Soft, non-tender, bowel sounds active all four quadrants,     no masses, no organomegaly   Extremities:   Extremities normal, atraumatic, no cyanosis or edema   Pulses:   2+ and symmetric all extremities   Skin:   Skin color, texture, turgor normal, no rashes or lesions   Neurologic:   Alert and oriented to person place and time. No focal deficits       Lab Results:   Recent Results (from the past 72 hour(s))   ECG 12 lead    Collection Time: 01/04/24  8:10 AM   Result Value Ref Range    Ventricular Rate 104 BPM    Atrial Rate 94 BPM    MT Interval  ms    QRSD Interval 84 ms    QT Interval 296 ms    QTC Interval 389 ms    P Axis  degrees    QRS Axis 62 degrees    T Wave Axis 44 degrees   CBC and differential    Collection Time: 01/04/24  8:27 AM   Result Value Ref Range    WBC 10.85 (H) 4.31 - 10.16 Thousand/uL    RBC 3.67 (L) 3.81 - 5.12 Million/uL    Hemoglobin 11.4 (L)  "11.5 - 15.4 g/dL    Hematocrit 34.4 (L) 34.8 - 46.1 %    MCV 94 82 - 98 fL    MCH 31.1 26.8 - 34.3 pg    MCHC 33.1 31.4 - 37.4 g/dL    RDW 12.3 11.6 - 15.1 %    MPV 8.5 (L) 8.9 - 12.7 fL    Platelets 390 149 - 390 Thousands/uL    nRBC 0 /100 WBCs    Neutrophils Relative 73 43 - 75 %    Immat GRANS % 1 0 - 2 %    Lymphocytes Relative 16 14 - 44 %    Monocytes Relative 5 4 - 12 %    Eosinophils Relative 4 0 - 6 %    Basophils Relative 1 0 - 1 %    Neutrophils Absolute 8.02 (H) 1.85 - 7.62 Thousands/µL    Immature Grans Absolute 0.06 0.00 - 0.20 Thousand/uL    Lymphocytes Absolute 1.69 0.60 - 4.47 Thousands/µL    Monocytes Absolute 0.52 0.17 - 1.22 Thousand/µL    Eosinophils Absolute 0.47 0.00 - 0.61 Thousand/µL    Basophils Absolute 0.09 0.00 - 0.10 Thousands/µL   HS Troponin 0hr (reflex protocol)    Collection Time: 01/04/24  8:27 AM   Result Value Ref Range    hs TnI 0hr 15 \"Refer to ACS Flowchart\"- see link ng/L   Comprehensive metabolic panel    Collection Time: 01/04/24  8:27 AM   Result Value Ref Range    Sodium 140 135 - 147 mmol/L    Potassium 3.7 3.5 - 5.3 mmol/L    Chloride 107 96 - 108 mmol/L    CO2 26 21 - 32 mmol/L    ANION GAP 7 mmol/L    BUN 17 5 - 25 mg/dL    Creatinine 0.69 0.60 - 1.30 mg/dL    Glucose 101 65 - 140 mg/dL    Calcium 8.8 8.4 - 10.2 mg/dL    Corrected Calcium 9.4 8.3 - 10.1 mg/dL    AST 15 13 - 39 U/L    ALT 25 7 - 52 U/L    Alkaline Phosphatase 146 (H) 34 - 104 U/L    Total Protein 6.6 6.4 - 8.4 g/dL    Albumin 3.3 (L) 3.5 - 5.0 g/dL    Total Bilirubin 0.41 0.20 - 1.00 mg/dL    eGFR 89 ml/min/1.73sq m   Protime-INR    Collection Time: 01/04/24  8:31 AM   Result Value Ref Range    Protime 13.2 11.6 - 14.5 seconds    INR 1.01 0.84 - 1.19   ECG 12 lead    Collection Time: 01/04/24  9:31 AM   Result Value Ref Range    Ventricular Rate 80 BPM    Atrial Rate 375 BPM    TN Interval  ms    QRSD Interval 84 ms    QT Interval 354 ms    QTC Interval 408 ms    P Axis 0 degrees    QRS Axis 55 " degrees    T Wave Axis 53 degrees     Imaging: I have personally reviewed pertinent reports.    EKG: atrial fibrillation hr 104bpm      Code Status: No Order  Advance Directive and Living Will:      Power of :    POLST:      Counseling / Coordination of Care  Total floor / unit time spent today 60 minutes.  Greater than 50% of total time was spent with the patient and / or family counseling and / or coordination of care.

## 2024-01-04 NOTE — ED PROVIDER NOTES
History  Chief Complaint   Patient presents with    Chest Pain     Pt c/o left sided CP since last night. Pt also c/o nausea. Pt states she had recent kidney stent placed on      68-year-old female with chest pressure and palpitations.  She had a recent upper respiratory infection that is improving as well as a ureteral stent. She had several similar episodes of palpitations during the last several days, but the current episode has been ongoing for several hours and has been very uncomfortable.         Prior to Admission Medications   Prescriptions Last Dose Informant Patient Reported? Taking?   Coenzyme Q10 (COQ-10 PO)  Self Yes No   Sig: CoQ-10   Multiple Vitamin (MULTIVITAMIN ADULT PO)  Self Yes No   Sig: Take 1 Dose by mouth   atorvastatin (LIPITOR) 10 mg tablet  Self Yes No   folic acid (FOLVITE) 1 mg tablet  Self Yes No   Sig: Take 1 tablet by mouth every morning   methotrexate 2.5 mg tablet  Self Yes No   Sig: methotrexate 2.5 mg tablet   2 tablet 1 x weekly   pindolol (VISKEN) 5 mg tablet  Self Yes No      Facility-Administered Medications: None       Past Medical History:   Diagnosis Date    Arthritis     Heart murmur     Hyperlipemia     Skin cancer     all 3 types       Past Surgical History:   Procedure Laterality Date    BREAST EXCISIONAL BIOPSY Left     approximately      SECTION      x2    DILATION AND CURETTAGE OF UTERUS      LAMINECTOMY      SKIN CANCER EXCISION      WISDOM TOOTH EXTRACTION         Family History   Problem Relation Age of Onset    No Known Problems Mother     No Known Problems Father     No Known Problems Daughter     No Known Problems Maternal Grandmother     Prostate cancer Maternal Grandfather 80    Leukemia Maternal Grandfather 80    No Known Problems Paternal Grandmother     No Known Problems Paternal Grandfather     No Known Problems Son     No Known Problems Maternal Aunt     No Known Problems Maternal Aunt     No Known Problems Paternal Aunt     No Known  Problems Paternal Aunt     Lymphoma Brother 35     I have reviewed and agree with the history as documented.    E-Cigarette/Vaping    E-Cigarette Use Never User      E-Cigarette/Vaping Substances     Social History     Tobacco Use    Smoking status: Never    Smokeless tobacco: Never   Vaping Use    Vaping status: Never Used   Substance Use Topics    Alcohol use: Yes     Comment: occasionally    Drug use: Not Currently       Review of Systems   Constitutional:  Positive for fatigue. Negative for fever.   HENT: Negative.     Eyes: Negative.    Respiratory:  Positive for chest tightness and shortness of breath (During episodes of palpitations).    Cardiovascular:  Positive for palpitations. Negative for chest pain.   Gastrointestinal: Negative.    Genitourinary: Negative.    Musculoskeletal: Negative.    Skin: Negative.    Neurological: Negative.    Hematological: Negative.    Psychiatric/Behavioral: Negative.         Physical Exam  Physical Exam  Vitals and nursing note reviewed.   Constitutional:       Appearance: She is well-developed. She is not ill-appearing.   HENT:      Head: Normocephalic and atraumatic.   Eyes:      Pupils: Pupils are equal, round, and reactive to light.   Cardiovascular:      Rate and Rhythm: Tachycardia present. Rhythm irregular.      Pulses: Normal pulses.   Pulmonary:      Effort: Pulmonary effort is normal.      Breath sounds: Normal breath sounds.   Abdominal:      Palpations: Abdomen is soft.      Tenderness: There is no abdominal tenderness.   Musculoskeletal:         General: Normal range of motion.      Cervical back: Normal range of motion.      Right lower leg: No tenderness. No edema.      Left lower leg: No tenderness. No edema.   Skin:     General: Skin is warm and dry.   Neurological:      General: No focal deficit present.      Mental Status: She is alert and oriented to person, place, and time.   Psychiatric:         Mood and Affect: Mood is anxious.         Behavior:  Behavior normal.         Vital Signs  ED Triage Vitals [01/04/24 0812]   Temperature Pulse Respirations Blood Pressure SpO2   99.2 °F (37.3 °C) 105 18 150/72 100 %      Temp Source Heart Rate Source Patient Position - Orthostatic VS BP Location FiO2 (%)   Tympanic Monitor Lying Right arm --      Pain Score       2           Vitals:    01/04/24 0812 01/04/24 0930   BP: 150/72 121/67   Pulse: 105 92   Patient Position - Orthostatic VS: Lying          Visual Acuity      ED Medications  Medications   sodium chloride (PF) 0.9 % injection 3 mL (has no administration in time range)   diltiazem (CARDIZEM) 125 mg in sodium chloride 0.9 % 125 mL infusion (10 mg/hr Intravenous New Bag 1/4/24 0919)   diltiazem (CARDIZEM) injection 15 mg (15 mg Intravenous Given 1/4/24 0918)       Diagnostic Studies  Results Reviewed       Procedure Component Value Units Date/Time    Protime-INR [[id number]]  (Normal) Collected: 01/04/24 0831    Lab Status: Final result Specimen: Blood from Arm, Left Updated: 01/04/24 0910     Protime 13.2 seconds      INR 1.01    HS Troponin 0hr (reflex protocol) [[id number]]  (Normal) Collected: 01/04/24 0827    Lab Status: Final result Specimen: Blood from Arm, Left Updated: 01/04/24 0906     hs TnI 0hr 15 ng/L     HS Troponin I 2hr [[id number]]     Lab Status: No result Specimen: Blood     Comprehensive metabolic panel [[id number]]  (Abnormal) Collected: 01/04/24 0827    Lab Status: Final result Specimen: Blood from Arm, Left Updated: 01/04/24 0900     Sodium 140 mmol/L      Potassium 3.7 mmol/L      Chloride 107 mmol/L      CO2 26 mmol/L      ANION GAP 7 mmol/L      BUN 17 mg/dL      Creatinine 0.69 mg/dL      Glucose 101 mg/dL      Calcium 8.8 mg/dL      Corrected Calcium 9.4 mg/dL      AST 15 U/L      ALT 25 U/L      Alkaline Phosphatase 146 U/L      Total Protein 6.6 g/dL      Albumin 3.3 g/dL      Total Bilirubin 0.41 mg/dL      eGFR 89 ml/min/1.73sq m     Narrative:      [employer]  guidelines for Chronic Kidney Disease (CKD):     Stage 1 with normal or high GFR (GFR > 90 mL/min/1.73 square meters)    Stage 2 Mild CKD (GFR = 60-89 mL/min/1.73 square meters)    Stage 3A Moderate CKD (GFR = 45-59 mL/min/1.73 square meters)    Stage 3B Moderate CKD (GFR = 30-44 mL/min/1.73 square meters)    Stage 4 Severe CKD (GFR = 15-29 mL/min/1.73 square meters)    Stage 5 End Stage CKD (GFR <15 mL/min/1.73 square meters)  Note: GFR calculation is accurate only with a steady state creatinine    CBC and differential [[id number]]  (Abnormal) Collected: 01/04/24 0827    Lab Status: Final result Specimen: Blood from Arm, Left Updated: 01/04/24 0839     WBC 10.85 Thousand/uL      RBC 3.67 Million/uL      Hemoglobin 11.4 g/dL      Hematocrit 34.4 %      MCV 94 fL      MCH 31.1 pg      MCHC 33.1 g/dL      RDW 12.3 %      MPV 8.5 fL      Platelets 390 Thousands/uL      nRBC 0 /100 WBCs      Neutrophils Relative 73 %      Immat GRANS % 1 %      Lymphocytes Relative 16 %      Monocytes Relative 5 %      Eosinophils Relative 4 %      Basophils Relative 1 %      Neutrophils Absolute 8.02 Thousands/µL      Immature Grans Absolute 0.06 Thousand/uL      Lymphocytes Absolute 1.69 Thousands/µL      Monocytes Absolute 0.52 Thousand/µL      Eosinophils Absolute 0.47 Thousand/µL      Basophils Absolute 0.09 Thousands/µL                    X-ray chest 1 view portable    (Results Pending)              Procedures  ECG 12 Lead Documentation Only    Date/Time: 1/4/2024 8:40 AM    Performed by: Bon Multani DO  Authorized by: Bon Multani DO    Indications / Diagnosis:  Chest pressure  ECG reviewed by me, the ED Provider: yes    Patient location:  ED  Previous ECG:     Previous ECG:  Compared to current    Similarity:  Changes noted  Interpretation:     Interpretation: abnormal    Rate:     ECG rate:  104    ECG rate assessment: tachycardic    Rhythm:     Rhythm: atrial fibrillation    Ectopy:     Ectopy: none    QRS:      QRS axis:  Normal  Conduction:     Conduction: normal    ST segments:     ST segments:  Normal  T waves:     T waves: non-specific    CriticalCare Time    Date/Time: 1/4/2024 8:50 AM    Performed by: Bon Multani DO  Authorized by: Bon Multani DO    Critical care provider statement:     Critical care time (minutes):  36    Critical care time was exclusive of:  Separately billable procedures and treating other patients and teaching time    Critical care was necessary to treat or prevent imminent or life-threatening deterioration of the following conditions:  Cardiac failure    Critical care was time spent personally by me on the following activities:  Obtaining history from patient or surrogate, development of treatment plan with patient or surrogate, discussions with consultants, evaluation of patient's response to treatment, examination of patient, re-evaluation of patient's condition, ordering and review of radiographic studies, ordering and review of laboratory studies, ordering and performing treatments and interventions and review of old charts    I assumed direction of critical care for this patient from another provider in my specialty: no             ED Course       [id number] SCORE      Flowsheet Row Most Recent Value   [id number]    Age 1 Filed at: 01/04/2024 0846   Sex 1 Filed at: 01/04/2024 0846   CHF History 0 Filed at: 01/04/2024 0846   HTN History 0 Filed at: 01/04/2024 0846   Stroke or TIA Symptoms Previously 0 Filed at: 01/04/2024 0846   Vascular Disease History 0 Filed at: 01/04/2024 0846   Diabetes History 0 Filed at: 01/04/2024 0846   [id number] Score 2 Filed at: 01/04/2024 0846          Cardizem 15 mg IV bolus (0.25 mg/kg), Cardizem infusion at 10 mg/h and titrate for heart rate less than 110.    HEART Risk Score      Flowsheet Row Most Recent Value   Heart Score Risk Calculator    History 1 Filed at: 01/04/2024 0935   ECG 1 Filed at: 01/04/2024 0935   Age 2 Filed  at: 01/04/2024 0935   Risk Factors 1 Filed at: 01/04/2024 0935   Troponin 1 Filed at: 01/04/2024 0935   HEART Score 6 Filed at: 01/04/2024 0935                                        Medical Decision Making  New onset atrial fibrillation with rapid ventricular response, with preceding paroxysmal episodes as described.  CHADS2 Vasc score is 2.  Given patient's discomfort with symptoms, will really need admission.  Will attempt to rate control, assess laboratory values, discussed with cardiology given restratification for anticoagulation.    Heart score 6    9:36 AM  Repeat ECG with AF rate in 80s post cardizem bolus and now on infusion    Amount and/or Complexity of Data Reviewed  External Data Reviewed: radiology and ECG.  Labs: ordered.  Radiology: ordered and independent interpretation performed.     Details: CXR appears unremarkable  ECG/medicine tests: ordered and independent interpretation performed.    Risk  Prescription drug management.             Disposition  Final diagnoses:   Chest pain   Atrial fibrillation with rapid ventricular response (HCC)   Anemia, unspecified type   Viral URI     Time reflects when diagnosis was documented in both MDM as applicable and the Disposition within this note       Time User Action Codes Description Comment    1/4/2024  9:43 AM Bon Multani Add [R07.9] Chest pain     1/4/2024  9:43 AM Bon Multani Add [I48.91] Atrial fibrillation with rapid ventricular response (HCC)     1/4/2024  9:43 AM Bon Multani Modify [R07.9] Chest pain     1/4/2024  9:43 AM Bon Multani Modify [I48.91] Atrial fibrillation with rapid ventricular response (HCC)     1/4/2024  9:44 AM Bon Multani Add [D64.9] Anemia, unspecified type     1/4/2024  9:44 AM Bon Multani Add [J06.9] Viral URI           ED Disposition       ED Disposition   Admit    Condition   Stable    Date/Time   Thu Jan 4, 2024 0977    Comment   Case was discussed with [facility] and the patient's admission status was  agreed to be Admission Status: inpatient status to the service of Dr. Guillen.               Follow-up Information    None         Patient's Medications   Discharge Prescriptions    No medications on file       No discharge procedures on file.    PDMP Review       None            ED Provider  Electronically Signed by             Bon Multani DO  01/04/24 0948

## 2024-01-04 NOTE — ASSESSMENT & PLAN NOTE
Note presentation of A-fib.  Follow-up on echocardiogram to help with strategy on anticoagulation.

## 2024-01-04 NOTE — ASSESSMENT & PLAN NOTE
Noted, mild. Could still be related to recent urologic procedure last week  Has now resolved   Monitor CBC

## 2024-01-04 NOTE — H&P
[facility]  H&P  Name: Angi Souza 68 y.o. female I MRN: [id number]  Unit/Bed#: ED 20 I Date of Admission: 1/4/2024   Date of Service: 1/4/2024 I Hospital Day: 0      Assessment/Plan   * New onset a-fib (HCC)  Assessment & Plan  Continue with Cardizem drip as ordered by the emergency room.  Transition to Lopressor as per cardiology.  Check echocardiogram  Telemetry unit  Consult cardiology  Follow-up on echocardiogram to facilitate strategy of anticoagulation.  Rule out valvular disease with reported history of mitral valve dysfunction noted.  Cardiology considering Eliquis as outpatient management for anticoagulation.  Will price check.  Rx sent to pharmacy.  In the  interim, we will empirically start heparin drip    Rheumatoid arthritis (HCC)  Assessment & Plan  Patient on methotrexate    Hyperlipidemia  Assessment & Plan  Lipitor 10 daily    Leukocytosis  Assessment & Plan  Trend white count.    Mitral valve disorder  Assessment & Plan  Note presentation of A-fib.  Follow-up on echocardiogram to help with strategy on anticoagulation.           VTE Prophylaxis: Heparin Drip  / sequential compression device   Code Status: fc  POLST: There is no POLST form on file for this patient (pre-hospital)      Anticipated Length of Stay:  Patient will be admitted on an Inpatient basis with an anticipated length of stay of  > 2 midnights.   Justification for Hospital Stay: need to montor symptoms    Total Time for Visit, including Counseling / Coordination of Care:  85 .  Greater than 50% of this total time spent on direct patient counseling and coordination of care.    Chief Complaint: Chest pain/palpitations    History of Present Illness:    Angi Souza is a 68 y.o. female who presents with symptoms of chest pressure and palpitations.  Patient reportedly had a recent upper respiratory infection that is improving.  Also patient reports a recent history of ureteral stent placement.   She reportedly presented to the emergency department with symptoms of chest pain and palpitations.  She was recently admitted at [facility] [address] on the seventh  Patient was admitted to the hospital on 2023 due to right sided flank pain.  Workup on CAT scan showing a stent in proximal noted hydronephrosis with concern for stent dysfunction.  She was admitted for urological intervention.  She was discharged on Flomax.  Post discharge she developed reported symptoms of viral-like illness which subsequently improved  She presents today with the above symptoms of palpitations chest pain      Review of Systems:    Review of Systems   Constitutional:  Negative for chills and diaphoresis.   Respiratory:  Positive for chest tightness and shortness of breath.    Cardiovascular:  Positive for chest pain. Negative for leg swelling.   Genitourinary:  Negative for difficulty urinating and dysuria.   Neurological:  Negative for dizziness.   Hematological:  Negative for adenopathy.   Psychiatric/Behavioral:  Negative for agitation and confusion.    All other systems reviewed and are negative.      Past Medical and Surgical History:     Past Medical History:   Diagnosis Date    Arthritis     Heart murmur     Hyperlipemia     Skin cancer     all 3 types       Past Surgical History:   Procedure Laterality Date    BREAST EXCISIONAL BIOPSY Left     approximately      SECTION      x2    DILATION AND CURETTAGE OF UTERUS      LAMINECTOMY      SKIN CANCER EXCISION      WISDOM TOOTH EXTRACTION         Meds/Allergies:    Prior to Admission medications    Medication Sig Start Date End Date Taking? Authorizing Provider   atorvastatin (LIPITOR) 10 mg tablet  19   Historical Provider, MD   Coenzyme Q10 (COQ-10 PO) CoQ-10    Historical Provider, MD   folic acid (FOLVITE) 1 mg tablet Take 1 tablet by mouth every morning    Historical Provider, MD   methotrexate 2.5 mg tablet methotrexate 2.5 mg tablet    "2 tablet 1 x weekly    Historical Provider, MD   Multiple Vitamin (MULTIVITAMIN ADULT PO) Take 1 Dose by mouth    Historical Provider, MD   pindolol (VISKEN) 5 mg tablet  7/8/19   Historical Provider, MD     I have reviewed home medications with patient personally.    Allergies:   Allergies   Allergen Reactions    Other Other (See Comments)     macrofurodantin--pt gets violently ill    Medical Tape Other (See Comments)     \"skin burns\"    Sulfa Antibiotics Rash       Social History:     Marital Status: /Civil Union   Occupation: retired  Patient Pre-hospital Living Situation: home  Patient Pre-hospital Level of Mobility: amb   Patient Pre-hospital Diet Restrictions: denied  Substance Use History:   Social History     Substance and Sexual Activity   Alcohol Use Yes    Comment: occasionally     Social History     Tobacco Use   Smoking Status Never   Smokeless Tobacco Never     Social History     Substance and Sexual Activity   Drug Use Not Currently       Family History:    Family History   Problem Relation Age of Onset    No Known Problems Mother     No Known Problems Father     No Known Problems Daughter     No Known Problems Maternal Grandmother     Prostate cancer Maternal Grandfather 80    Leukemia Maternal Grandfather 80    No Known Problems Paternal Grandmother     No Known Problems Paternal Grandfather     No Known Problems Son     No Known Problems Maternal Aunt     No Known Problems Maternal Aunt     No Known Problems Paternal Aunt     No Known Problems Paternal Aunt     Lymphoma Brother 35       Physical Exam:     Vitals:   Blood Pressure: 117/71 (01/04/24 1400)  Pulse: 90 (01/04/24 1400)  Temperature: 99.2 °F (37.3 °C) (01/04/24 0812)  Temp Source: Tympanic (01/04/24 0812)  Respirations: 16 (01/04/24 1400)  SpO2: 95 % (01/04/24 1400)    Physical Exam  Constitutional:       Appearance: Normal appearance.   HENT:      Head: Normocephalic and atraumatic.   Cardiovascular:      Rate and Rhythm: Normal " rate and regular rhythm.   Pulmonary:      Effort: Pulmonary effort is normal.      Breath sounds: Normal breath sounds.   Abdominal:      General: Abdomen is flat.      Palpations: Abdomen is soft.   Musculoskeletal:         General: Normal range of motion.   Neurological:      General: No focal deficit present.      Mental Status: She is alert and oriented to person, place, and time.   Psychiatric:         Mood and Affect: Mood normal.         Additional Data:     Lab Results: I have personally reviewed pertinent reports.      Results from last 7 days   Lab Units 01/04/24  1429 01/04/24  0827   WBC Thousand/uL 10.62* 10.85*   HEMOGLOBIN g/dL 11.6 11.4*   HEMATOCRIT % 36.1 34.4*   PLATELETS Thousands/uL 408* 390   NEUTROS PCT %  --  73   LYMPHS PCT %  --  16   MONOS PCT %  --  5   EOS PCT %  --  4     Results from last 7 days   Lab Units 01/04/24  0827   SODIUM mmol/L 140   POTASSIUM mmol/L 3.7   CHLORIDE mmol/L 107   CO2 mmol/L 26   BUN mg/dL 17   CREATININE mg/dL 0.69   ANION GAP mmol/L 7   CALCIUM mg/dL 8.8   ALBUMIN g/dL 3.3*   TOTAL BILIRUBIN mg/dL 0.41   ALK PHOS U/L 146*   ALT U/L 25   AST U/L 15   GLUCOSE RANDOM mg/dL 101     Results from last 7 days   Lab Units 01/04/24  1429   INR  1.07                   Imaging: I have personally reviewed pertinent reports.      X-ray chest 1 view portable   Final Result by Benjie Peck MD (01/04 1229)      No acute cardiopulmonary disease.                  Workstation performed: [id number]                 ** Please Note: This note has been constructed using a voice recognition system. **

## 2024-01-04 NOTE — ASSESSMENT & PLAN NOTE
Continue with Cardizem drip as ordered by the emergency room.  Transition to Lopressor as per cardiology.  Check echocardiogram  Telemetry unit  Consult cardiology  Follow-up on echocardiogram to facilitate strategy of anticoagulation.  Rule out valvular disease with reported history of mitral valve dysfunction noted.  Cardiology considering Eliquis as outpatient management for anticoagulation.  Will price check.  Rx sent to pharmacy.  In the  interim, we will empirically start heparin drip

## 2024-01-04 NOTE — ASSESSMENT & PLAN NOTE
Presented with chest heaviness and palpitations. Noted to have associated SOB. She was found to have new onset a-fib with RVR  Cardiology following, input appreciated  On IV heparin gtt as well IV Cardizem. Price check Eliquis, sent message to CM to f/u   Started on lopressor 50 mg BID in effort to wean gtt  TSH WNL  Echo pending  Continue telemetry

## 2024-01-05 ENCOUNTER — APPOINTMENT (INPATIENT)
Dept: NON INVASIVE DIAGNOSTICS | Facility: HOSPITAL | Age: 69
DRG: 309 | End: 2024-01-05
Payer: MEDICARE

## 2024-01-05 VITALS
HEART RATE: 84 BPM | TEMPERATURE: 98.2 F | OXYGEN SATURATION: 96 % | HEIGHT: 66 IN | BODY MASS INDEX: 24.87 KG/M2 | RESPIRATION RATE: 18 BRPM | SYSTOLIC BLOOD PRESSURE: 99 MMHG | WEIGHT: 154.76 LBS | DIASTOLIC BLOOD PRESSURE: 66 MMHG

## 2024-01-05 PROBLEM — R82.90 ABNORMAL URINALYSIS: Status: ACTIVE | Noted: 2024-01-05

## 2024-01-05 PROBLEM — D72.829 LEUKOCYTOSIS: Status: RESOLVED | Noted: 2024-01-04 | Resolved: 2024-01-05

## 2024-01-05 LAB
ALBUMIN SERPL BCP-MCNC: 3.3 G/DL (ref 3.5–5)
ALP SERPL-CCNC: 141 U/L (ref 34–104)
ALT SERPL W P-5'-P-CCNC: 28 U/L (ref 7–52)
ANION GAP SERPL CALCULATED.3IONS-SCNC: 10 MMOL/L
AORTIC ROOT: 2.7 CM
APICAL FOUR CHAMBER EJECTION FRACTION: 58 %
APTT PPP: 31 SECONDS (ref 23–37)
APTT PPP: 55 SECONDS (ref 23–37)
ASCENDING AORTA: 2.6 CM
AST SERPL W P-5'-P-CCNC: 21 U/L (ref 13–39)
BACTERIA UR QL AUTO: ABNORMAL /HPF
BASOPHILS # BLD AUTO: 0.08 THOUSANDS/ÂΜL (ref 0–0.1)
BASOPHILS NFR BLD AUTO: 1 % (ref 0–1)
BILIRUB SERPL-MCNC: 0.36 MG/DL (ref 0.2–1)
BILIRUB UR QL STRIP: NEGATIVE
BUN SERPL-MCNC: 18 MG/DL (ref 5–25)
CALCIUM ALBUM COR SERPL-MCNC: 9.8 MG/DL (ref 8.3–10.1)
CALCIUM SERPL-MCNC: 9.2 MG/DL (ref 8.4–10.2)
CHLORIDE SERPL-SCNC: 107 MMOL/L (ref 96–108)
CLARITY UR: ABNORMAL
CO2 SERPL-SCNC: 23 MMOL/L (ref 21–32)
COLOR UR: ABNORMAL
CREAT SERPL-MCNC: 0.76 MG/DL (ref 0.6–1.3)
E WAVE DECELERATION TIME: 126 MS
E/A RATIO: 6.06
EOSINOPHIL # BLD AUTO: 0.27 THOUSAND/ÂΜL (ref 0–0.61)
EOSINOPHIL NFR BLD AUTO: 3 % (ref 0–6)
ERYTHROCYTE [DISTWIDTH] IN BLOOD BY AUTOMATED COUNT: 12.4 % (ref 11.6–15.1)
FRACTIONAL SHORTENING: 33 (ref 28–44)
GFR SERPL CREATININE-BSD FRML MDRD: 80 ML/MIN/1.73SQ M
GLUCOSE SERPL-MCNC: 110 MG/DL (ref 65–140)
GLUCOSE UR STRIP-MCNC: NEGATIVE MG/DL
HCT VFR BLD AUTO: 33.2 % (ref 34.8–46.1)
HGB BLD-MCNC: 10.7 G/DL (ref 11.5–15.4)
HGB UR QL STRIP.AUTO: ABNORMAL
IMM GRANULOCYTES # BLD AUTO: 0.04 THOUSAND/UL (ref 0–0.2)
IMM GRANULOCYTES NFR BLD AUTO: 0 % (ref 0–2)
INTERVENTRICULAR SEPTUM IN DIASTOLE (PARASTERNAL SHORT AXIS VIEW): 1 CM
INTERVENTRICULAR SEPTUM: 1 CM (ref 0.6–1.1)
KETONES UR STRIP-MCNC: NEGATIVE MG/DL
LAAS-AP2: 13.6 CM2
LAAS-AP4: 19.5 CM2
LEFT ATRIUM SIZE: 3.4 CM
LEFT ATRIUM VOLUME (MOD BIPLANE): 45 ML
LEFT ATRIUM VOLUME INDEX (MOD BIPLANE): 25.1 ML/M2
LEFT INTERNAL DIMENSION IN SYSTOLE: 2.7 CM (ref 2.1–4)
LEFT VENTRICULAR INTERNAL DIMENSION IN DIASTOLE: 4 CM (ref 3.5–6)
LEFT VENTRICULAR POSTERIOR WALL IN END DIASTOLE: 1 CM
LEFT VENTRICULAR STROKE VOLUME: 42 ML
LEUKOCYTE ESTERASE UR QL STRIP: ABNORMAL
LVSV (TEICH): 42 ML
LYMPHOCYTES # BLD AUTO: 2.05 THOUSANDS/ÂΜL (ref 0.6–4.47)
LYMPHOCYTES NFR BLD AUTO: 22 % (ref 14–44)
MCH RBC QN AUTO: 30.3 PG (ref 26.8–34.3)
MCHC RBC AUTO-ENTMCNC: 32.2 G/DL (ref 31.4–37.4)
MCV RBC AUTO: 94 FL (ref 82–98)
MITRAL REGURGITATION PEAK VELOCITY: 4.24 M/S
MITRAL VALVE MEAN INFLOW VELOCITY: 3.66 M/S
MITRAL VALVE REGURGITANT PEAK GRADIENT: 73 MMHG
MONOCYTES # BLD AUTO: 0.52 THOUSAND/ÂΜL (ref 0.17–1.22)
MONOCYTES NFR BLD AUTO: 6 % (ref 4–12)
MUCOUS THREADS UR QL AUTO: ABNORMAL
MV E'TISSUE VEL-LAT: 4 CM/S
MV E'TISSUE VEL-SEP: 5 CM/S
MV PEAK A VEL: 0.18 M/S
MV PEAK E VEL: 109 CM/S
MV STENOSIS PRESSURE HALF TIME: 37 MS
MV VALVE AREA P 1/2 METHOD: 5.95
NEUTROPHILS # BLD AUTO: 6.41 THOUSANDS/ÂΜL (ref 1.85–7.62)
NEUTS SEG NFR BLD AUTO: 68 % (ref 43–75)
NITRITE UR QL STRIP: NEGATIVE
NON-SQ EPI CELLS URNS QL MICRO: ABNORMAL /HPF
NRBC BLD AUTO-RTO: 0 /100 WBCS
PH UR STRIP.AUTO: 6 [PH]
PLATELET # BLD AUTO: 388 THOUSANDS/UL (ref 149–390)
PMV BLD AUTO: 8.4 FL (ref 8.9–12.7)
POTASSIUM SERPL-SCNC: 4 MMOL/L (ref 3.5–5.3)
PROT SERPL-MCNC: 6.4 G/DL (ref 6.4–8.4)
PROT UR STRIP-MCNC: ABNORMAL MG/DL
RA PRESSURE ESTIMATED: 15 MMHG
RBC # BLD AUTO: 3.53 MILLION/UL (ref 3.81–5.12)
RBC #/AREA URNS AUTO: ABNORMAL /HPF
RIGHT ATRIUM AREA SYSTOLE A4C: 10.7 CM2
RIGHT VENTRICLE ID DIMENSION: 2.6 CM
RV PSP: 35 MMHG
SL CV DOP CALC MV VTI RETROGRADE: 148 CM
SL CV LEFT ATRIUM LENGTH A2C: 4.6 CM
SL CV LV EF: 60
SL CV MV MEAN GRADIENT RETROGRADE: 57 MMHG
SL CV PED ECHO LEFT VENTRICLE DIASTOLIC VOLUME (MOD BIPLANE) 2D: 69 ML
SL CV PED ECHO LEFT VENTRICLE SYSTOLIC VOLUME (MOD BIPLANE) 2D: 26 ML
SODIUM SERPL-SCNC: 140 MMOL/L (ref 135–147)
SP GR UR STRIP.AUTO: 1.02 (ref 1–1.03)
TR MAX PG: 20 MMHG
TR PEAK VELOCITY: 2.3 M/S
TRICUSPID ANNULAR PLANE SYSTOLIC EXCURSION: 1.8 CM
TRICUSPID VALVE PEAK REGURGITATION VELOCITY: 2.26 M/S
UROBILINOGEN UR STRIP-ACNC: <2 MG/DL
WBC # BLD AUTO: 9.37 THOUSAND/UL (ref 4.31–10.16)
WBC #/AREA URNS AUTO: ABNORMAL /HPF

## 2024-01-05 PROCEDURE — 99232 SBSQ HOSP IP/OBS MODERATE 35: CPT | Performed by: INTERNAL MEDICINE

## 2024-01-05 PROCEDURE — 99239 HOSP IP/OBS DSCHRG MGMT >30: CPT | Performed by: INTERNAL MEDICINE

## 2024-01-05 PROCEDURE — NC001 PR NO CHARGE: Performed by: INTERNAL MEDICINE

## 2024-01-05 PROCEDURE — 85730 THROMBOPLASTIN TIME PARTIAL: CPT | Performed by: INTERNAL MEDICINE

## 2024-01-05 PROCEDURE — 97162 PT EVAL MOD COMPLEX 30 MIN: CPT

## 2024-01-05 PROCEDURE — 93306 TTE W/DOPPLER COMPLETE: CPT | Performed by: INTERNAL MEDICINE

## 2024-01-05 PROCEDURE — 85025 COMPLETE CBC W/AUTO DIFF WBC: CPT | Performed by: INTERNAL MEDICINE

## 2024-01-05 PROCEDURE — 97166 OT EVAL MOD COMPLEX 45 MIN: CPT

## 2024-01-05 PROCEDURE — 80053 COMPREHEN METABOLIC PANEL: CPT | Performed by: INTERNAL MEDICINE

## 2024-01-05 PROCEDURE — 93306 TTE W/DOPPLER COMPLETE: CPT

## 2024-01-05 RX ORDER — METOPROLOL TARTRATE 50 MG/1
50 TABLET, FILM COATED ORAL EVERY 12 HOURS SCHEDULED
Qty: 60 TABLET | Refills: 0 | Status: SHIPPED | OUTPATIENT
Start: 2024-01-05 | End: 2024-01-05

## 2024-01-05 RX ORDER — METOPROLOL TARTRATE 50 MG/1
75 TABLET, FILM COATED ORAL EVERY 12 HOURS SCHEDULED
Qty: 90 TABLET | Refills: 2 | Status: SHIPPED | OUTPATIENT
Start: 2024-01-05

## 2024-01-05 RX ADMIN — HEPARIN SODIUM 2000 UNITS: 1000 INJECTION INTRAVENOUS; SUBCUTANEOUS at 08:58

## 2024-01-05 RX ADMIN — METOPROLOL TARTRATE 50 MG: 50 TABLET ORAL at 08:59

## 2024-01-05 RX ADMIN — ACETAMINOPHEN 650 MG: 325 TABLET, FILM COATED ORAL at 08:59

## 2024-01-05 RX ADMIN — FOLIC ACID 1000 MCG: 1 TABLET ORAL at 08:59

## 2024-01-05 RX ADMIN — CEFPODOXIME PROXETIL 200 MG: 200 TABLET, FILM COATED ORAL at 08:59

## 2024-01-05 RX ADMIN — HEPARIN SODIUM 4000 UNITS: 1000 INJECTION INTRAVENOUS; SUBCUTANEOUS at 01:49

## 2024-01-05 RX ADMIN — HEPARIN SODIUM 12 UNITS/KG/HR: 10000 INJECTION, SOLUTION INTRAVENOUS at 01:48

## 2024-01-05 RX ADMIN — CEFPODOXIME PROXETIL 200 MG: 200 TABLET, FILM COATED ORAL at 17:41

## 2024-01-05 NOTE — PROGRESS NOTES
[facility]  Progress Note  Name: Angi Souza I  MRN: [id number]  Unit/Bed#: -01 I Date of Admission: 1/4/2024   Date of Service: 1/5/2024 I Hospital Day: 1      TT'd CM to f/u price check of Eliquis so we can transition of heparin gtt.   Assessment/Plan   * New onset a-fib (HCC)  Assessment & Plan  Presented with chest heaviness and palpitations. Noted to have associated SOB. She was found to have new onset a-fib with RVR  Cardiology following, input appreciated  On IV heparin gtt as well IV Cardizem. Price check Eliquis, sent message to CM to f/u   Started on lopressor 50 mg BID in effort to wean gtt  TSH WNL  Echo pending  Continue telemetry       Abnormal urinalysis  Assessment & Plan  Noted, however patient with hx of nephrolithiasis s/p cystoscopy with laser lithotripsy and stone extraction with stent exchange 12/27 at [facility]. 1/5 is last day of 10 day course of abx   Afebrile  Recommend outpatient urology f/u     Rheumatoid arthritis (HCC)  Assessment & Plan  Patient on methotrexate    Hyperlipidemia  Assessment & Plan  Lipitor 10 daily    Leukocytosis  Assessment & Plan  Noted, mild. Could still be related to recent urologic procedure last week  Has now resolved   Monitor CBC     Mitral valve disorder  Assessment & Plan  Reported hx of MVP  F/u echo  Cards following                VTE Pharmacologic Prophylaxis:   Moderate Risk (Score 3-4) - Pharmacological DVT Prophylaxis Ordered: heparin drip.    Mobility:   Basic Mobility Inpatient Raw Score: 24  JH-HLM Goal: 8: Walk 250 feet or more  JH-HLM Achieved: 8: Walk 250 feet ot more  HLM Goal achieved. Continue to encourage appropriate mobility.    Patient Centered Rounds: I performed bedside rounds with nursing staff today.   Discussions with Specialists or Other Care Team Provider: appreciate cards input. Messaged CM.     Education and Discussions with Family / Patient: Patient declined call to .      Total Time Spent on Date of Encounter in care of patient: 34 mins. This time was spent on one or more of the following: performing physical exam; counseling and coordination of care; obtaining or reviewing history; documenting in the medical record; reviewing/ordering tests, medications or procedures; communicating with other healthcare professionals and discussing with patient's family/caregivers.    Current Length of Stay: 1 day(s)  Current Patient Status: Inpatient   Certification Statement: The patient will continue to require additional inpatient hospital stay due to weaning IV cardizem and AC Plan   Discharge Plan: Anticipate discharge later today or tomorrow to home.    Code Status: Level 1 - Full Code    Subjective:   Doing okay. Seen earlier. She had a mild nosebleed this AM. Has a headache. Urinary symptoms overall improved with hydration. She is anxious regarding her several recent hospital admissions as of late.     Objective:     Vitals:   Temp (24hrs), Av.6 °F (37 °C), Min:98.4 °F (36.9 °C), Max:99 °F (37.2 °C)    Temp:  [98.4 °F (36.9 °C)-99 °F (37.2 °C)] 98.5 °F (36.9 °C)  HR:  [74-98] 75  Resp:  [16-20] 18  BP: ()/(57-95) 110/61  SpO2:  [92 %-97 %] 92 %  Body mass index is 24.99 kg/m².     Input and Output Summary (last 24 hours):     Intake/Output Summary (Last 24 hours) at 2024 1000  Last data filed at 2024 0905  Gross per 24 hour   Intake 481.38 ml   Output --   Net 481.38 ml       Physical Exam:   Physical Exam  Vitals and nursing note reviewed.   Constitutional:       General: She is not in acute distress.     Comments: On RA    Cardiovascular:      Rate and Rhythm: Normal rate. Rhythm irregular.   Pulmonary:      Effort: No respiratory distress.      Breath sounds: Normal breath sounds. No wheezing or rales.   Abdominal:      General: Bowel sounds are normal. There is no distension.      Palpations: Abdomen is soft.   Musculoskeletal:      Right lower leg: No edema.       Left lower leg: No edema.   Skin:     Coloration: Skin is pale.   Neurological:      Mental Status: She is oriented to person, place, and time.   Psychiatric:         Mood and Affect: Mood normal.          Additional Data:     Labs:  Results from last 7 days   Lab Units 01/05/24  0148   WBC Thousand/uL 9.37   HEMOGLOBIN g/dL 10.7*   HEMATOCRIT % 33.2*   PLATELETS Thousands/uL 388   NEUTROS PCT % 68   LYMPHS PCT % 22   MONOS PCT % 6   EOS PCT % 3     Results from last 7 days   Lab Units 01/05/24  0148   SODIUM mmol/L 140   POTASSIUM mmol/L 4.0   CHLORIDE mmol/L 107   CO2 mmol/L 23   BUN mg/dL 18   CREATININE mg/dL 0.76   ANION GAP mmol/L 10   CALCIUM mg/dL 9.2   ALBUMIN g/dL 3.3*   TOTAL BILIRUBIN mg/dL 0.36   ALK PHOS U/L 141*   ALT U/L 28   AST U/L 21   GLUCOSE RANDOM mg/dL 110     Results from last 7 days   Lab Units 01/04/24  1429   INR  1.07                   Lines/Drains:  Invasive Devices       Peripheral Intravenous Line  Duration             Peripheral IV 01/04/24 Left Antecubital 1 day    Peripheral IV 01/04/24 Distal;Dorsal (posterior);Left Forearm <1 day                      Telemetry:  Telemetry Orders (From admission, onward)               24 Hour Telemetry Monitoring  Continuous x 24 Hours (Telem)        Question:  Reason for 24 Hour Telemetry  Answer:  Arrhythmias requiring acute medical intervention / PPM or ICD malfunction                     Telemetry Reviewed: Atrial fibrillation. HR averaging 80s-90s  Indication for Continued Telemetry Use: Arrthymias requiring medical therapy             Imaging: No pertinent imaging reviewed.    Recent Cultures (last 7 days):         Last 24 Hours Medication List:   Current Facility-Administered Medications   Medication Dose Route Frequency Provider Last Rate    acetaminophen  650 mg Oral Q6H PRN Hetul Tan, DO      atorvastatin  10 mg Oral Daily With Dinner Hetul Tan, DO      cefpodoxime  200 mg Oral BID With Meals Mark Villareal PA-C      diltiazem   1-15 mg/hr Intravenous Titrated Bon Multani, DO 7.5 mg/hr (01/05/24 0946)    folic acid  1,000 mcg Oral QAM Hetul Tan, DO      heparin (porcine)  3-20 Units/kg/hr (Order-Specific) Intravenous Titrated Hetul Tan, DO 14 Units/kg/hr (01/05/24 0905)    heparin (porcine)  2,000 Units Intravenous Q6H PRN Hetul Tan, DO      heparin (porcine)  4,000 Units Intravenous Q6H PRN Hetul Tan, DO      metoprolol tartrate  50 mg Oral Q12H [facility] Patrice Moore MD      sodium chloride (PF)  3 mL Intravenous Q1H PRN Bon Multani DO          Today, Patient Was Seen By: Skylar Samuel PA-C    **Please Note: This note may have been constructed using a voice recognition system.**

## 2024-01-05 NOTE — OCCUPATIONAL THERAPY NOTE
Occupational Therapy Evaluation     Patient Name: Angi Souza  Today's Date: 2024  Problem List  Principal Problem:    New onset a-fib (HCC)  Active Problems:    Mitral valve disorder    Leukocytosis    Hyperlipidemia    Rheumatoid arthritis (HCC)    Abnormal urinalysis    Past Medical History  Past Medical History:   Diagnosis Date    Arthritis     Heart murmur     Hyperlipemia     Skin cancer     all 3 types     Past Surgical History  Past Surgical History:   Procedure Laterality Date    BREAST EXCISIONAL BIOPSY Left     approximately      SECTION      x2    DILATION AND CURETTAGE OF UTERUS      LAMINECTOMY      SKIN CANCER EXCISION      WISDOM TOOTH EXTRACTION             24 0907   OT Last Visit   OT Visit Date 24   Note Type   Note type Evaluation   Additional Comments Pt seen with PT to increase safety, decrease fall risk, and maximize functional/occupational performance 2* medical complexity which is a regression from pt's functional baseline.   Pain Assessment   Pain Assessment Tool 0-10   Pain Score 2   Pain Location/Orientation Location: Head   Effect of Pain on Daily Activities Impacts ability to engage in valued occupations   Hospital Pain Intervention(s) Repositioned;Ambulation/increased activity;Emotional support   Restrictions/Precautions   Weight Bearing Precautions Per Order No   Other Precautions Multiple lines;Telemetry;Pain   Home Living   Type of Home House   Home Layout Multi-level;1/2 bath on main level;Bed/bath upstairs;Performs ADLs on one level;Access   Bathroom Shower/Tub Tub/shower unit   Bathroom Toilet Standard   Bathroom Equipment Grab bars in shower   Bathroom Accessibility Accessible   Home Equipment Other (Comment)  (No DME)   Additional Comments Pt reports living in a [place] home with 5 PRESTON with her , reporting that her bedroom is on the 3rd floor; no DME PTA   Prior Function   Level of [place] Independent with ADLs;Independent  "with functional mobility;Independent with IADLS   Lives With Spouse   Receives Help From Family   IADLs Independent with driving;Independent with meal prep;Independent with medication management   Falls in the last 6 months 0   Vocational Retired   Comments (+) driving   Lifestyle   Autonomy PTA, pt was independent in ADLs/IADLs and uses no DME for functional mobility; (+) driving   Reciprocal Relationships Lives with her    Service to Others Retired; 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 ; 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   Intrinsic Gratification Enjoys training horses and being active   Subjective   Subjective \"I try to keep active.\"   ADL   Where Assessed Chair   Eating Assistance 7  Independent   Grooming Assistance 7  Independent   UB Bathing Assistance 7  Independent   LB Bathing Assistance 7  Independent   UB Dressing Assistance 7  Independent   LB Dressing Assistance 7  Independent   Toileting Assistance  7  Independent   Functional Assistance 5  Supervision/Setup   Bed Mobility   Supine to Sit Unable to assess   Sit to Supine Unable to assess   Additional Comments Pt found sitting EOB upon arrival; @ end of session, pt left sitting upright in recliner with all functional needs in reach   Transfers   Sit to Stand 5  Supervision   Stand to Sit 5  Supervision   Additional Comments w/ no DME   Functional Mobility   Functional Mobility 5  Supervision   Additional Comments Pt completed long household functional mobility distances @ supervision level w/ no DME   Balance   Static Sitting Normal   Dynamic Sitting Good   Static Standing Fair +   Dynamic Standing Fair +   Ambulatory Fair   Activity Tolerance   Activity Tolerance Patient tolerated treatment well   Medical Staff Made Aware ELVIRA Funes   Nurse Made Aware RN cleared   RUE Assessment   RUE Assessment WFL   LUE Assessment   LUE Assessment WFL   Hand Function   Gross Motor Coordination Functional   Fine Motor Coordination Functional "   Cognition   Overall Cognitive Status WFL   Arousal/Participation Alert;Responsive;Arousable;Cooperative   Attention Within functional limits   Orientation Level Oriented X4   Memory Within functional limits   Following Commands Follows all commands and directions without difficulty   Comments Pt very pleasant and cooperative   Assessment   Prognosis Good   Assessment Pt is a pleasant and cooperative 67 yo female who [facility] with chest pressure and palpitations in which pt dx w/ new onset of A-fib. Pt  has a past medical history of Arthritis, Heart murmur, Hyperlipemia, and Skin cancer. Pt with active OT orders and OT consulted to assess pt's functional status and occupational performance to determine safe d/c needs. Pt seen with PT to increase safety, decrease fall risk, and maximize functional/occupational performance 2* medical complexity which is a regression from pt's functional baseline. Pt lives with her  in a [place] home with 5 PRESTON. PTA, pt was independent in ADLs/IADLs and reports using no DME for functional mobility. (+) driving. Discussed role and scope of OT in which pt was receptive. At this time, pt is not demonstrating any significant occupational deficits and is functioning at a level of functional transfers/mobility @ supervision level w/ no DME and UB/LB ADLs @ an independent level. From an OT standpoint, recommend discharge to home with increased support once medically stable. Pt was receptive regarding education on returning home safely with energy conservation techniques and demonstrated good carryover during occupational/functional performance. At this time, pt demonstrates good insight/safety awareness and does not express any concerns regarding performing ADL/IADL/functional mobility tasks. No further skilled acute care OT services are needed at this time.   The patient's raw score on the -PAC Daily Activity Inpatient Short Form is 24. A raw score of greater than or  equal to 19 suggests the patient may benefit from discharge to home. Please refer to the recommendation of the Occupational Therapist for safe discharge planning.  Recommend continued engagement in ADL/functional mobility tasks with nursing and restorative therapy staff as appropriate to promote the highest level of independence prior to discharge. OT is discharging pt from caseload at this time, please reconsult if needed.   Goals   Patient Goals To go home   Plan   OT Frequency Eval only   Discharge Recommendation   Rehab Resource Intensity Level, OT No post-acute rehabilitation needs   AM-PAC Daily Activity Inpatient   Lower Body Dressing 4   Bathing 4   Toileting 4   Upper Body Dressing 4   Grooming 4   Eating 4   Daily Activity Raw Score 24   Daily Activity Standardized Score (Calc for Raw Score >=11) 57.54   AM-PAC Applied Cognition Inpatient   Following a Speech/Presentation 4   Understanding Ordinary Conversation 4   Taking Medications 4   Remembering Where Things Are Placed or Put Away 4   Remembering List of 4-5 Errands 4   Taking Care of Complicated Tasks 4   Applied Cognition Raw Score 24   Applied Cognition Standardized Score 62.21   End of Consult   Education Provided Yes   Patient Position at End of Consult Bedside chair;All needs within reach   Nurse Communication Nurse aware of consult       Yennifer Chambers MS, OTR/L

## 2024-01-05 NOTE — PLAN OF CARE
Problem: PAIN - ADULT  Goal: Verbalizes/displays adequate comfort level or baseline comfort level  Description: Interventions:  - Encourage patient to monitor pain and request assistance  - Assess pain using appropriate pain scale  - Administer analgesics based on type and severity of pain and evaluate response  - Implement non-pharmacological measures as appropriate and evaluate response  - Consider cultural and social influences on pain and pain management  - Notify physician/advanced practitioner if interventions unsuccessful or patient reports new pain  Outcome: Progressing     Problem: INFECTION - ADULT  Goal: Absence or prevention of progression during hospitalization  Description: INTERVENTIONS:  - Assess and monitor for signs and symptoms of infection  - Monitor lab/diagnostic results  - Monitor all insertion sites, i.e. indwelling lines, tubes, and drains  - Monitor endotracheal if appropriate and nasal secretions for changes in amount and color  - [place] appropriate cooling/warming therapies per order  - Administer medications as ordered  - Instruct and encourage patient and family to use good hand hygiene technique  - Identify and instruct in appropriate isolation precautions for identified infection/condition  Outcome: Progressing  Goal: Absence of fever/infection during neutropenic period  Description: INTERVENTIONS:  - Monitor WBC    Outcome: Progressing     Problem: SAFETY ADULT  Goal: Patient will remain free of falls  Description: INTERVENTIONS:  - Educate patient/family on patient safety including physical limitations  - Instruct patient to call for assistance with activity   - Consult OT/PT to assist with strengthening/mobility   - Keep Call bell within reach  - Keep bed low and locked with side rails adjusted as appropriate  - Keep care items and personal belongings within reach  - Initiate and maintain comfort rounds  - Make Fall Risk Sign visible to staff  - Offer Toileting every  Hours,  in advance of need  - Initiate/Maintain alarm  - Obtain necessary fall risk management equipment:   - Apply yellow socks and bracelet for high fall risk patients  - Consider moving patient to room near nurses station  Outcome: Progressing  Goal: Maintain or return to baseline ADL function  Description: INTERVENTIONS:  -  Assess patient's ability to carry out ADLs; assess patient's baseline for ADL function and identify physical deficits which impact ability to perform ADLs (bathing, care of mouth/teeth, toileting, grooming, dressing, etc.)  - Assess/evaluate cause of self-care deficits   - Assess range of motion  - Assess patient's mobility; develop plan if impaired  - Assess patient's need for assistive devices and provide as appropriate  - Encourage maximum independence but intervene and supervise when necessary  - Involve family in performance of ADLs  - Assess for home care needs following discharge   - Consider OT consult to assist with ADL evaluation and planning for discharge  - Provide patient education as appropriate  Outcome: Progressing  Goal: Maintains/Returns to pre admission functional level  Description: INTERVENTIONS:  - Perform AM-PAC 6 Click Basic Mobility/ Daily Activity assessment daily.  - Set and communicate daily mobility goal to care team and patient/family/caregiver.   - Collaborate with rehabilitation services on mobility goals if consulted  - Perform Range of Motion  times a day.  - Reposition patient every  hours.  - Dangle patient  times a day  - Stand patient  times a day  - Ambulate patient  times a day  - Out of bed to chair times a day   - Out of bed for meals times a day  - Out of bed for toileting  - Record patient progress and toleration of activity level   Outcome: Progressing     Problem: DISCHARGE PLANNING  Goal: Discharge to home or other facility with appropriate resources  Description: INTERVENTIONS:  - Identify barriers to discharge w/patient and caregiver  - Arrange for  needed discharge resources and transportation as appropriate  - Identify discharge learning needs (meds, wound care, etc.)  - Arrange for interpretive services to assist at discharge as needed  - Refer to Case Management Department for coordinating discharge planning if the patient needs post-hospital services based on physician/advanced practitioner order or complex needs related to functional status, cognitive ability, or social support system  Outcome: Progressing     Problem: Knowledge Deficit  Goal: Patient/family/caregiver demonstrates understanding of disease process, treatment plan, medications, and discharge instructions  Description: Complete learning assessment and assess knowledge base.  Interventions:  - Provide teaching at level of understanding  - Provide teaching via preferred learning methods  Outcome: Progressing

## 2024-01-05 NOTE — PHYSICAL THERAPY NOTE
PHYSICAL THERAPY EVALUATION  NAME:  Angi Souza  DATE: 24    AGE:   68 y.o.  Mrn:   [id number]  ADMIT DX:  Chest pain [R07.9]  Viral URI [J06.9]  New onset a-fib (HCC) [I48.91]  Atrial fibrillation with rapid ventricular response (HCC) [I48.91]  Anemia, unspecified type [D64.9]  Problem List:   Patient Active Problem List   Diagnosis    Tear of left supraspinatus tendon    Infraspinatus tendon tear, left, initial encounter    Age-related nuclear cataract of both eyes    Displacement of lumbar intervertebral disc without myelopathy    Epiretinal membrane    Retinal hemorrhage    Vitreous hemorrhage (HCC)    Lumbar radiculopathy    Low back pain    Mitral valve disorder    Palpitations    Posterior vitreous detachment    Tear of right retina without detachment    Adenomatous polyp of cecum    New onset a-fib (HCC)    Hyperlipidemia    Rheumatoid arthritis (HCC)    Abnormal urinalysis       Past Medical History  Past Medical History:   Diagnosis Date    Arthritis     Heart murmur     Hyperlipemia     Skin cancer     all 3 types       Past Surgical History  Past Surgical History:   Procedure Laterality Date    BREAST EXCISIONAL BIOPSY Left     approximately      SECTION      x2    DILATION AND CURETTAGE OF UTERUS      LAMINECTOMY      SKIN CANCER EXCISION      WISDOM TOOTH EXTRACTION         Length Of Stay: 1  Performed at least 2 patient identifiers during session: Name and Birthday       24 0920   PT Last Visit   PT Visit Date 24   Note Type   Note type Evaluation   Pain Assessment   Pain Assessment Tool 0-10   Pain Score 2   Pain Location/Orientation Location: Head   Restrictions/Precautions   Weight Bearing Precautions Per Order No   Other Precautions Multiple lines;Telemetry;Pain   Home Living   Type of Home House   Home Layout Multi-level;1/2 bath on main level;Bed/bath upstairs;Performs ADLs on one level;Stairs to enter without rails  (bedroom on 3rd FL; 5 [address])   Bathroom  Shower/Tub Tub/shower unit   Bathroom Toilet Standard   Bathroom Equipment Grab bars in shower   Home Equipment   (no AD used at baseline)   Prior Function   Level of [place] Independent with ADLs;Independent with functional mobility;Independent with IADLS   Lives With Spouse   Receives Help From Family   IADLs Independent with driving;Independent with meal prep;Independent with medication management   Falls in the last 6 months 0   Vocational Retired   General   Family/Caregiver Present No   Cognition   Overall Cognitive Status WFL   Arousal/Participation Alert   Attention Within functional limits   Orientation Level Oriented X4   Memory Within functional limits   Following Commands Follows all commands and directions without difficulty   RUE Assessment   RUE Assessment WFL   LUE Assessment   LUE Assessment WFL   RLE Assessment   RLE Assessment WFL   LLE Assessment   LLE Assessment WFL   Vision-Basic Assessment   Current Vision Wears glasses only for reading   Sharp/Dull   RLE Sharp/Dull Impaired   Transfers   Sit to Stand 5  Supervision   Stand to Sit 5  Supervision   Additional Comments pt denied dizziness with transitional movement   Ambulation/Elevation   Gait pattern WNL   Gait Assistance 5  Supervision   Assistive Device None   Distance 250 ft   Ambulation/Elevation Additional Comments no LOB or deficits noted  (HR reanged from  with ambulation)   Balance   Static Sitting Normal   Dynamic Sitting Normal   Static Standing Good   Dynamic Standing Good   Ambulatory Good   Endurance Deficit   Endurance Deficit No   Activity Tolerance   Activity Tolerance Patient tolerated treatment well   Assessment   Prognosis Good   Assessment Pt is 68 y.o. female seen for PT evaluation s/p admit to [facility] on 1/4/2024 w/ New onset a-fib (HCC). PT consulted to assess pt's functional mobility and d/c needs. Order placed for PT eval and tx, w/ up and OOB as tolerated order. Pt agreeable to PT  session upon  arrival, pt found supine in bed. The following objective measures performed on IE also reveal limitations: AM-PAC 6 Clicks 24/24.  Patient was supervision w/ all functional mobility w/ no AD.  Pt presents with safe transfers, ambulation, and bed mobility.  From PT/mobility standpoint, recommendation at time of d/c would be anticipate no needs/resources. Upon conclusion pt seated in recliner. D/c PT services at this time. Complexity: Comorbidities affecting pt's physical performance at time of assessment include:  URI . Personal factors affecting pt at time of IE include: lack of handrail at steps, lives in multistory home, and steps to enter home. Please find objective findings from PT assessment regarding body systems outlined above with impairments and limitations including pain, altered sensation, and irregular HR .  Pt's clinical presentation is currently evolving seen in pt's presentation of abnormal H&H and pain. The patient's AM-PAC Basic Mobility Inpatient Short Form Raw Score is 24. Please also refer to the recommendation of the Physical Therapist for safe discharge planning. RN verbalized pt appropriate for PT session. Pt seen as a co-eval with OT due to the patient's co-morbidities and clinically evolving presentation.   Barriers to Discharge None   Goals   Patient Goals to go home   Discharge Recommendation   Rehab Resource Intensity Level, PT No post-acute rehabilitation needs   AM-PAC Basic Mobility Inpatient   Turning in Flat Bed Without Bedrails 4   Lying on Back to Sitting on Edge of Flat Bed Without Bedrails 4   Moving Bed to Chair 4   Standing Up From Chair Using Arms 4   Walk in Room 4   Climb 3-5 Stairs With Railing 4   Basic Mobility Inpatient Raw Score 24   Basic Mobility Standardized Score 57.68   Highest Level Of Mobility   JH-HLM Goal 8: Walk 250 feet or more   JH-HLM Achieved 8: Walk 250 feet ot more       Time In: 0908  Time Out: 0920  Total Evaluation Minutes: 12    Shantel Jacob  PT

## 2024-01-05 NOTE — CASE MANAGEMENT
Case Management Discharge Planning Note    Patient name Angi Souza  Location /-01 MRN [id number]  : 1955 Date 2024       Current Admission Date: 2024  Current Admission Diagnosis:New onset a-fib (HCC)   Patient Active Problem List    Diagnosis Date Noted    Abnormal urinalysis 2024    New onset a-fib (HCC) 2024    Leukocytosis 2024    Hyperlipidemia 2024    Rheumatoid arthritis (HCC) 2024    Adenomatous polyp of cecum 2022    Vitreous hemorrhage (HCC) 2021    Tear of right retina without detachment 2021    Displacement of lumbar intervertebral disc without myelopathy 10/07/2020    Lumbar radiculopathy 10/07/2020    Low back pain 10/07/2020    Tear of left supraspinatus tendon 2019    Infraspinatus tendon tear, left, initial encounter 2019    Age-related nuclear cataract of both eyes 2019    Epiretinal membrane 2019    Retinal hemorrhage 2019    Posterior vitreous detachment 2019    Mitral valve disorder 2012    Palpitations 2012      LOS (days): 1  Geometric Mean LOS (GMLOS) (days): 2.3  Days to GMLOS:1.3     OBJECTIVE:  Risk of Unplanned Readmission Score: 10.03         Current admission status: Inpatient   Preferred Pharmacy:   [id number] [address]  Phone: [phone] Fax: [phone]    Primary Care Provider: Sonia Shukla MD    Primary Insurance: MEDICARE  Secondary Insurance: [facility]    DISCHARGE DETAILS:     CM called [facility],[phone] for [employer] price check, [phone]. Provider updated.

## 2024-01-05 NOTE — ASSESSMENT & PLAN NOTE
Noted, however patient with hx of nephrolithiasis s/p cystoscopy with laser lithotripsy and stone extraction with stent exchange 12/27 at [facility]. 1/5 is last day of 10 day course of abx   Afebrile  Recommend outpatient urology f/u

## 2024-01-05 NOTE — PROGRESS NOTES
"Progress Note - Cardiology Team 1  Angi Souza 68 y.o. female MRN: [id number]  Unit/Bed#: -01 Encounter: [id number]        Principal Problem:    New onset a-fib (HCC)  Active Problems:    Mitral valve disorder    Leukocytosis    Hyperlipidemia    Rheumatoid arthritis (HCC)    Abnormal urinalysis      Assessment    Atrial fibrillation  New diagnosis  Presents with RVR-heart rate 100-140's  Associated with palpitations, chest tightness, shortness of breath and fatigue  S/p IV cardizem bolus 15 mg followed by infusion, remains  at 10 mg.  Heart rate 80s  0-hour troponin 15 with 2-hour troponin 3.  No evidence of ACS.  [id number] equals 2- recommended AC.  CXR clear   BMP and TSH normal   Patient reports improved symptoms with rate control this morning    Reported history of MVP  Reports remote palpitations- on pindolol. No echo for review.     HLD-patient takes atorvastatin 40 mg daily    Nephrolithiasis-status post cystoscopy with laser lithotripsy and stone extraction with stent  Exchange 12/27. Plans for second stage right ureteroscopy with possible balloon dilatation  Mild leukocytosis  Reports resolved hematuria  Reports resolved pain    Rheumatoid arthritis - on methotrexate     Plan  Would rate control for now. started  lopressor 50mg every 12 hours. Wean off IV cardizem.   We have recommended AC. Once able to AC for 4 weeks without interruption would plan TANIA/DCCV.   Check TTE for LV function, wall motion, valvular heart disease  Would consider outpatient stress testing.   5.   Once cardizem weaned off would assure adequate heart rate control both at rest and with          Ambulation.   6.   Hopeful discharge later today with cardiology f/u    Subjective/Objective   Chief Complaint/subjective  No events overnight.  Patient feeling well.      Vitals: /61   Pulse 71   Temp 98.5 °F (36.9 °C) (Oral)   Resp 18   Ht 5' 6\" (1.676 m)   Wt 70.2 kg (154 lb 12.8 oz)   SpO2 92%   BMI 24.99 kg/m² " "    Vitals:    01/04/24 2029   Weight: 70.2 kg (154 lb 12.8 oz)     Orthostatic Blood Pressures      Flowsheet Row Most Recent Value   Blood Pressure 110/61 filed at 01/05/2024 0859   Patient Position - Orthostatic VS Lying filed at 01/05/2024 0709              Intake/Output Summary (Last 24 hours) at 1/5/2024 1120  Last data filed at 1/5/2024 0905  Gross per 24 hour   Intake 481.38 ml   Output --   Net 481.38 ml       Invasive Devices       Peripheral Intravenous Line  Duration             Peripheral IV 01/04/24 Left Antecubital 1 day    Peripheral IV 01/04/24 Distal;Dorsal (posterior);Left Forearm <1 day                    Current Facility-Administered Medications   Medication Dose Route Frequency    acetaminophen (TYLENOL) tablet 650 mg  650 mg Oral Q6H PRN    atorvastatin (LIPITOR) tablet 10 mg  10 mg Oral Daily With Dinner    cefpodoxime (VANTIN) tablet 200 mg  200 mg Oral BID With Meals    diltiazem (CARDIZEM) 125 mg in sodium chloride 0.9 % 125 mL infusion  1-15 mg/hr Intravenous Titrated    folic acid (FOLVITE) tablet 1,000 mcg  1,000 mcg Oral QAM    heparin (porcine) 25,000 units in 0.45% NaCl 250 mL infusion (premix)  3-20 Units/kg/hr (Order-Specific) Intravenous Titrated    heparin (porcine) injection 2,000 Units  2,000 Units Intravenous Q6H PRN    heparin (porcine) injection 4,000 Units  4,000 Units Intravenous Q6H PRN    metoprolol tartrate (LOPRESSOR) tablet 50 mg  50 mg Oral Q12H SUNITHA    sodium chloride (PF) 0.9 % injection 3 mL  3 mL Intravenous Q1H PRN         Physical Exam: /61   Pulse 71   Temp 98.5 °F (36.9 °C) (Oral)   Resp 18   Ht 5' 6\" (1.676 m)   Wt 70.2 kg (154 lb 12.8 oz)   SpO2 92%   BMI 24.99 kg/m²     General Appearance:    Alert, cooperative, no distress, appears stated age   Head:    Normocephalic, no scleral icterus   Eyes:    PERRL   Nose:   Nares normal, septum midline, no drainage    Throat:   Lips, mucosa, and tongue normal   Neck:   Supple, symmetrical, trachea " midline,       no carotid    bruit or JVD   Back:     Symmetric, no CVA tenderness   Lungs:     Clear to auscultation bilaterally, respirations unlabored   Chest Wall:    No tenderness or deformity    Heart:    Irregular rate and rhythm, S1 and S2 normal, no murmur, rub   or gallop   Abdomen:     Soft, non-tender, bowel sounds active all four quadrants,     no masses, no organomegaly   Extremities:   Extremities normal, atraumatic, no cyanosis or edema   Pulses:   2+ and symmetric all extremities   Skin:   Skin color, texture, turgor normal, no rashes or lesions   Neurologic:   Alert and oriented to person place and time, no focal deficits                 Lab Results:   Recent Results (from the past 72 hour(s))   ECG 12 lead    Collection Time: 01/04/24  8:10 AM   Result Value Ref Range    Ventricular Rate 104 BPM    Atrial Rate 94 BPM    WA Interval  ms    QRSD Interval 84 ms    QT Interval 296 ms    QTC Interval 389 ms    P Axis  degrees    QRS Axis 62 degrees    T Wave Axis 44 degrees   CBC and differential    Collection Time: 01/04/24  8:27 AM   Result Value Ref Range    WBC 10.85 (H) 4.31 - 10.16 Thousand/uL    RBC 3.67 (L) 3.81 - 5.12 Million/uL    Hemoglobin 11.4 (L) 11.5 - 15.4 g/dL    Hematocrit 34.4 (L) 34.8 - 46.1 %    MCV 94 82 - 98 fL    MCH 31.1 26.8 - 34.3 pg    MCHC 33.1 31.4 - 37.4 g/dL    RDW 12.3 11.6 - 15.1 %    MPV 8.5 (L) 8.9 - 12.7 fL    Platelets 390 149 - 390 Thousands/uL    nRBC 0 /100 WBCs    Neutrophils Relative 73 43 - 75 %    Immat GRANS % 1 0 - 2 %    Lymphocytes Relative 16 14 - 44 %    Monocytes Relative 5 4 - 12 %    Eosinophils Relative 4 0 - 6 %    Basophils Relative 1 0 - 1 %    Neutrophils Absolute 8.02 (H) 1.85 - 7.62 Thousands/µL    Immature Grans Absolute 0.06 0.00 - 0.20 Thousand/uL    Lymphocytes Absolute 1.69 0.60 - 4.47 Thousands/µL    Monocytes Absolute 0.52 0.17 - 1.22 Thousand/µL    Eosinophils Absolute 0.47 0.00 - 0.61 Thousand/µL    Basophils Absolute 0.09 0.00 -  "0.10 Thousands/µL   HS Troponin 0hr (reflex protocol)    Collection Time: 01/04/24  8:27 AM   Result Value Ref Range    hs TnI 0hr 15 \"Refer to ACS Flowchart\"- see link ng/L   Comprehensive metabolic panel    Collection Time: 01/04/24  8:27 AM   Result Value Ref Range    Sodium 140 135 - 147 mmol/L    Potassium 3.7 3.5 - 5.3 mmol/L    Chloride 107 96 - 108 mmol/L    CO2 26 21 - 32 mmol/L    ANION GAP 7 mmol/L    BUN 17 5 - 25 mg/dL    Creatinine 0.69 0.60 - 1.30 mg/dL    Glucose 101 65 - 140 mg/dL    Calcium 8.8 8.4 - 10.2 mg/dL    Corrected Calcium 9.4 8.3 - 10.1 mg/dL    AST 15 13 - 39 U/L    ALT 25 7 - 52 U/L    Alkaline Phosphatase 146 (H) 34 - 104 U/L    Total Protein 6.6 6.4 - 8.4 g/dL    Albumin 3.3 (L) 3.5 - 5.0 g/dL    Total Bilirubin 0.41 0.20 - 1.00 mg/dL    eGFR 89 ml/min/1.73sq m   TSH, 3rd generation    Collection Time: 01/04/24  8:27 AM   Result Value Ref Range    TSH 3RD GENERATON 1.455 0.450 - 4.500 uIU/mL   Protime-INR    Collection Time: 01/04/24  8:31 AM   Result Value Ref Range    Protime 13.2 11.6 - 14.5 seconds    INR 1.01 0.84 - 1.19   ECG 12 lead    Collection Time: 01/04/24  9:31 AM   Result Value Ref Range    Ventricular Rate 80 BPM    Atrial Rate 375 BPM    CT Interval  ms    QRSD Interval 84 ms    QT Interval 354 ms    QTC Interval 408 ms    P Axis 0 degrees    QRS Axis 55 degrees    T Wave [place] 53 degrees   HS Troponin I 2hr    Collection Time: 01/04/24 11:00 AM   Result Value Ref Range    hs TnI 2hr 3 \"Refer to ACS Flowchart\"- see link ng/L    Delta 2hr hsTnI -12 <20 ng/L   APTT six (6) hours after Heparin bolus/drip initiation or dosing change    Collection Time: 01/04/24  2:29 PM   Result Value Ref Range    PTT 29 23 - 37 seconds   CBC    Collection Time: 01/04/24  2:29 PM   Result Value Ref Range    WBC 10.62 (H) 4.31 - 10.16 Thousand/uL    RBC 3.79 (L) 3.81 - 5.12 Million/uL    Hemoglobin 11.6 11.5 - 15.4 g/dL    Hematocrit 36.1 34.8 - 46.1 %    MCV 95 82 - 98 fL    MCH 30.6 26.8 - " 34.3 pg    MCHC 32.1 31.4 - 37.4 g/dL    RDW 12.3 11.6 - 15.1 %    Platelets 408 (H) 149 - 390 Thousands/uL    MPV 8.5 (L) 8.9 - 12.7 fL   Protime-INR    Collection Time: 01/04/24  2:29 PM   Result Value Ref Range    Protime 13.8 11.6 - 14.5 seconds    INR 1.07 0.84 - 1.19   Urinalysis with microscopic    Collection Time: 01/04/24 11:21 PM   Result Value Ref Range    Color, UA Orange     Clarity, UA Extra Turbid     Specific Gravity, UA 1.018 1.003 - 1.030    pH, UA 6.0 4.5, 5.0, 5.5, 6.0, 6.5, 7.0, 7.5, 8.0    Leukocytes, UA Large (A) Negative    Nitrite, UA Negative Negative    Protein,  (3+) (A) Negative mg/dl    Glucose, UA Negative Negative mg/dl    Ketones, UA Negative Negative mg/dl    Urobilinogen, UA <2.0 <2.0 mg/dl mg/dl    Bilirubin, UA Negative Negative    Occult Blood, UA Large (A) Negative    RBC, UA Innumerable (A) None Seen, 1-2 /hpf    WBC, UA Innumerable (A) None Seen, 1-2 /hpf    Epithelial Cells None Seen None Seen, Occasional /hpf    Bacteria, UA Moderate (A) None Seen, Occasional /hpf    MUCUS THREADS Innumerable (A) None Seen   APTT    Collection Time: 01/05/24  1:48 AM   Result Value Ref Range    PTT 31 23 - 37 seconds   CBC and differential    Collection Time: 01/05/24  1:48 AM   Result Value Ref Range    WBC 9.37 4.31 - 10.16 Thousand/uL    RBC 3.53 (L) 3.81 - 5.12 Million/uL    Hemoglobin 10.7 (L) 11.5 - 15.4 g/dL    Hematocrit 33.2 (L) 34.8 - 46.1 %    MCV 94 82 - 98 fL    MCH 30.3 26.8 - 34.3 pg    MCHC 32.2 31.4 - 37.4 g/dL    RDW 12.4 11.6 - 15.1 %    MPV 8.4 (L) 8.9 - 12.7 fL    Platelets 388 149 - 390 Thousands/uL    nRBC 0 /100 WBCs    Neutrophils Relative 68 43 - 75 %    Immat GRANS % 0 0 - 2 %    Lymphocytes Relative 22 14 - 44 %    Monocytes Relative 6 4 - 12 %    Eosinophils Relative 3 0 - 6 %    Basophils Relative 1 0 - 1 %    Neutrophils Absolute 6.41 1.85 - 7.62 Thousands/µL    Immature Grans Absolute 0.04 0.00 - 0.20 Thousand/uL    Lymphocytes Absolute 2.05 0.60 - 4.47  Thousands/µL    Monocytes Absolute 0.52 0.17 - 1.22 Thousand/µL    Eosinophils Absolute 0.27 0.00 - 0.61 Thousand/µL    Basophils Absolute 0.08 0.00 - 0.10 Thousands/µL   Comprehensive metabolic panel    Collection Time: 01/05/24  1:48 AM   Result Value Ref Range    Sodium 140 135 - 147 mmol/L    Potassium 4.0 3.5 - 5.3 mmol/L    Chloride 107 96 - 108 mmol/L    CO2 23 21 - 32 mmol/L    ANION GAP 10 mmol/L    BUN 18 5 - 25 mg/dL    Creatinine 0.76 0.60 - 1.30 mg/dL    Glucose 110 65 - 140 mg/dL    Calcium 9.2 8.4 - 10.2 mg/dL    Corrected Calcium 9.8 8.3 - 10.1 mg/dL    AST 21 13 - 39 U/L    ALT 28 7 - 52 U/L    Alkaline Phosphatase 141 (H) 34 - 104 U/L    Total Protein 6.4 6.4 - 8.4 g/dL    Albumin 3.3 (L) 3.5 - 5.0 g/dL    Total Bilirubin 0.36 0.20 - 1.00 mg/dL    eGFR 80 ml/min/1.73sq m   APTT    Collection Time: 01/05/24  8:05 AM   Result Value Ref Range    PTT 55 (H) 23 - 37 seconds     Imaging: I have personally reviewed pertinent reports.    Tele- afib      Counseling / Coordination of Care  Total time spent today 25 minutes. Greater than 50% of total time was spent with the patient and / or family counseling and / or coordination of care.

## 2024-01-05 NOTE — PLAN OF CARE
Problem: PAIN - ADULT  Goal: Verbalizes/displays adequate comfort level or baseline comfort level  Description: Interventions:  - Encourage patient to monitor pain and request assistance  - Assess pain using appropriate pain scale  - Administer analgesics based on type and severity of pain and evaluate response  - Implement non-pharmacological measures as appropriate and evaluate response  - Consider cultural and social influences on pain and pain management  - Notify physician/advanced practitioner if interventions unsuccessful or patient reports new pain  Outcome: Progressing     Problem: INFECTION - ADULT  Goal: Absence or prevention of progression during hospitalization  Description: INTERVENTIONS:  - Assess and monitor for signs and symptoms of infection  - Monitor lab/diagnostic results  - Monitor all insertion sites, i.e. indwelling lines, tubes, and drains  - Monitor endotracheal if appropriate and nasal secretions for changes in amount and color  - [employer] appropriate cooling/warming therapies per order  - Administer medications as ordered  - Instruct and encourage patient and family to use good hand hygiene technique  - Identify and instruct in appropriate isolation precautions for identified infection/condition  Outcome: Progressing  Goal: Absence of fever/infection during neutropenic period  Description: INTERVENTIONS:  - Monitor WBC    Outcome: Progressing     Problem: SAFETY ADULT  Goal: Patient will remain free of falls  Description: INTERVENTIONS:  - Educate patient/family on patient safety including physical limitations  - Instruct patient to call for assistance with activity   - Consult OT/PT to assist with strengthening/mobility   - Keep Call bell within reach  - Keep bed low and locked with side rails adjusted as appropriate  - Keep care items and personal belongings within reach  - Initiate and maintain comfort rounds  - Make Fall Risk Sign visible to staff  - Offer Toileting every  Hours,  in advance of need  - Initiate/Maintain alarm  - Obtain necessary fall risk management equipment:   - Apply yellow socks and bracelet for high fall risk patients  - Consider moving patient to room near nurses station  Outcome: Progressing  Goal: Maintain or return to baseline ADL function  Description: INTERVENTIONS:  -  Assess patient's ability to carry out ADLs; assess patient's baseline for ADL function and identify physical deficits which impact ability to perform ADLs (bathing, care of mouth/teeth, toileting, grooming, dressing, etc.)  - Assess/evaluate cause of self-care deficits   - Assess range of motion  - Assess patient's mobility; develop plan if impaired  - Assess patient's need for assistive devices and provide as appropriate  - Encourage maximum independence but intervene and supervise when necessary  - Involve family in performance of ADLs  - Assess for home care needs following discharge   - Consider OT consult to assist with ADL evaluation and planning for discharge  - Provide patient education as appropriate  Outcome: Progressing  Goal: Maintains/Returns to pre admission functional level  Description: INTERVENTIONS:  - Perform AM-PAC 6 Click Basic Mobility/ Daily Activity assessment daily.  - Set and communicate daily mobility goal to care team and patient/family/caregiver.   - Collaborate with rehabilitation services on mobility goals if consulted  - Perform Range of Motion  times a day.  - Reposition patient every  hours.  - Dangle patient  times a day  - Stand patient  times a day  - Ambulate patient  times a day  - Out of bed to chair  times a day   - Out of bed for meals times a day  - Out of bed for toileting  - Record patient progress and toleration of activity level   Outcome: Progressing     Problem: DISCHARGE PLANNING  Goal: Discharge to home or other facility with appropriate resources  Description: INTERVENTIONS:  - Identify barriers to discharge w/patient and caregiver  - Arrange for  needed discharge resources and transportation as appropriate  - Identify discharge learning needs (meds, wound care, etc.)  - Arrange for interpretive services to assist at discharge as needed  - Refer to Case Management Department for coordinating discharge planning if the patient needs post-hospital services based on physician/advanced practitioner order or complex needs related to functional status, cognitive ability, or social support system  Outcome: Progressing     Problem: Knowledge Deficit  Goal: Patient/family/caregiver demonstrates understanding of disease process, treatment plan, medications, and discharge instructions  Description: Complete learning assessment and assess knowledge base.  Interventions:  - Provide teaching at level of understanding  - Provide teaching via preferred learning methods  Outcome: Progressing

## 2024-01-05 NOTE — DISCHARGE SUMMARY
[facility]  Discharge- Angi Souza 1955, 68 y.o. female MRN: [id number]  Unit/Bed#: [facility]-01 Encounter: [id number]  Primary Care Provider: Sonia Shukla MD   Date and time admitted to hospital: 1/4/2024  8:11 AM    * New onset a-fib (HCC)  Assessment & Plan  Presented with chest heaviness and palpitations. Noted to have associated SOB. She was found to have new onset a-fib with RVR  Cardiology following, input appreciated  Initially placed on IV heparin gtt as well IV Cardizem, now weaned off IV Cardizem gtt and on lopressor 50 mg BID (stopped home pindolol)  Eliquis $500/monthly based on CM price check with 2 pharmacies. Provided 30 day free coupon card as this will be easier to interrupt therapy given need for upcoming urologic procedure in next few weeks  Cards to determine further plan going forward (continuation of DOAC vs transition to warfarin) as outpatient. Placed ambulatory referral to cardiology on dc instructions   TSH WNL  Echo formal report pending however per cards note shows mild MVR and EF 60%      Abnormal urinalysis  Assessment & Plan  Noted, however patient with hx of nephrolithiasis s/p cystoscopy with laser lithotripsy and stone extraction with stent exchange 12/27 at [facility]. 1/5 is last day of 10 day course of abx   Afebrile  Recommend outpatient urology f/u. She will need to contact primary urologist especially in light of new AC     Rheumatoid arthritis (HCC)  Assessment & Plan  Patient on methotrexate    Hyperlipidemia  Assessment & Plan  Lipitor 10 daily    Mitral valve disorder  Assessment & Plan  Reported hx of MVP  Echo pre-wilburn showing mild regurg  Cards following     Leukocytosis-resolved as of 1/5/2024  Assessment & Plan  Noted, mild. Could still be related to recent urologic procedure last week  Has now resolved   Monitor CBC         Medical Problems       Resolved Problems  Date Reviewed: 1/5/2024            Resolved     Leukocytosis 1/5/2024     Resolved by  Skylar Samuel PA-C        Discharging Physician / Practitioner: Skylar Samuel PA-C  PCP: Sonia Shukla MD  Admission Date:   Admission Orders (From admission, onward)       Ordered        01/04/24 0948  INPATIENT ADMISSION  Once                          Discharge Date: 01/05/24    Consultations During Hospital Stay:  Cardiology    Procedures Performed:   CXR: No acute cardiopulmonary disease.     Significant Findings / Test Results:   See above     Incidental Findings:   See above       Test Results Pending at Discharge (will require follow up):   none     Outpatient Tests Requested:  F/u Cards  F/u PCP  F/u urology at [facility]    Complications:  none    Reason for Admission: New onset afib    Hospital Course:   Angi Souza is a 68 y.o. female patient who originally presented to the hospital on 1/4/2024 due to new afib with RVR. Her symptoms were chest heaviness and palpitations. She recently was at [facility] with urologic issues and due to have stent exchange in coming weeks.     She was admitted and placed on IV heparin gtt and cardizem gtt. She was seen by cardiology. Had echo. TSH WNL.     D/w Cards on day of discharge.  She is cleared from their standpoint.  She was started on Lopressor 50 mg twice daily.  Unfortunately, Eliquis will be very expensive for her, however given need for upcoming procedures, would prefer not to use Coumadin at this time.  Cardiology recommended free 30-day coupon card and they will discuss with her as an outpatient plan going forward (DOAC vs transition to coumadin).     Please see above list of diagnoses and related plan for additional information.     Condition at Discharge: stable    Discharge Day Visit / Exam:   * Please refer to separate progress note for these details *    Discharge instructions/Information to patient and family:   See after visit summary for information provided to patient and family.      Provisions for Follow-Up  Care:  See after visit summary for information related to follow-up care and any pertinent home health orders.      Mobility at time of Discharge:   Basic Mobility Inpatient Raw Score: 24  JH-HLM Goal: 8: Walk 250 feet or more  JH-HLM Achieved: 7: Walk 25 feet or more  HLM Goal achieved. Continue to encourage appropriate mobility.     Disposition:   Home    Planned Readmission: no     Discharge Statement:  I spent 34 minutes discharging the patient. This time was spent on the day of discharge. I had direct contact with the patient on the day of discharge. Greater than 50% of the total time was spent examining patient, answering all patient questions, arranging and discussing plan of care with patient as well as directly providing post-discharge instructions.  Additional time then spent on discharge activities.    Discharge Medications:  See after visit summary for reconciled discharge medications provided to patient and/or family.      **Please Note: This note may have been constructed using a voice recognition system**

## 2024-01-05 NOTE — DISCHARGE INSTR - AVS FIRST PAGE
Take metoprolol 75 mg twice daily  If you have palpitations you can take an extra 25 mg once   Hold off on blood thinner until after your ureteral stent is out

## 2024-01-05 NOTE — ASSESSMENT & PLAN NOTE
Noted, however patient with hx of nephrolithiasis s/p cystoscopy with laser lithotripsy and stone extraction with stent exchange 12/27 at [facility]. 1/5 is last day of 10 day course of abx   Afebrile  Recommend outpatient urology f/u. She will need to contact primary urologist especially in light of new AC

## 2024-01-05 NOTE — ASSESSMENT & PLAN NOTE
Presented with chest heaviness and palpitations. Noted to have associated SOB. She was found to have new onset a-fib with RVR  Cardiology following, input appreciated  Initially placed on IV heparin gtt as well IV Cardizem, now weaned off IV Cardizem gtt and on lopressor 50 mg BID (stopped home pindolol)  Eliquis $500/monthly based on CM price check with 2 pharmacies. Provided 30 day free coupon card as this will be easier to interrupt therapy given need for upcoming urologic procedure in next few weeks  Cards to determine further plan going forward (continuation of DOAC vs transition to warfarin) as outpatient. Placed ambulatory referral to cardiology on dc instructions   TSH WNL  Echo formal report pending however per cards note shows mild MVR and EF 60%
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
## 2024-01-10 ENCOUNTER — OFFICE VISIT (OUTPATIENT)
Dept: CARDIOLOGY CLINIC | Facility: CLINIC | Age: 69
End: 2024-01-10
Payer: MEDICARE

## 2024-01-10 VITALS
DIASTOLIC BLOOD PRESSURE: 58 MMHG | WEIGHT: 155 LBS | HEART RATE: 65 BPM | OXYGEN SATURATION: 98 % | SYSTOLIC BLOOD PRESSURE: 106 MMHG | HEIGHT: 66 IN | BODY MASS INDEX: 24.91 KG/M2

## 2024-01-10 DIAGNOSIS — I48.0 PAF (PAROXYSMAL ATRIAL FIBRILLATION) (HCC): Primary | ICD-10-CM

## 2024-01-10 DIAGNOSIS — E78.5 HYPERLIPIDEMIA, UNSPECIFIED HYPERLIPIDEMIA TYPE: ICD-10-CM

## 2024-01-10 PROBLEM — Z79.4 ENCOUNTER FOR LONG-TERM (CURRENT) USE OF INSULIN (HCC): Status: ACTIVE | Noted: 2024-01-10

## 2024-01-10 PROCEDURE — 99215 OFFICE O/P EST HI 40 MIN: CPT | Performed by: NURSE PRACTITIONER

## 2024-01-10 PROCEDURE — 93000 ELECTROCARDIOGRAM COMPLETE: CPT | Performed by: NURSE PRACTITIONER

## 2024-01-10 RX ORDER — METOPROLOL TARTRATE 50 MG/1
TABLET, FILM COATED ORAL
Qty: 90 TABLET | Refills: 2 | Status: SHIPPED | OUTPATIENT
Start: 2024-01-10

## 2024-01-22 ENCOUNTER — CLINICAL SUPPORT (OUTPATIENT)
Dept: CARDIOLOGY CLINIC | Facility: CLINIC | Age: 69
End: 2024-01-22

## 2024-01-22 DIAGNOSIS — I48.0 PAF (PAROXYSMAL ATRIAL FIBRILLATION) (HCC): Primary | ICD-10-CM

## 2024-01-22 PROCEDURE — RECHECK: Performed by: NURSE PRACTITIONER

## 2024-01-22 NOTE — PROGRESS NOTES
Placed 14 day Zio AT (linked transmit box) on pt per Meg.  Zio number: [id number]. Instructions given, Angi is aware and understood.

## 2024-02-14 ENCOUNTER — CLINICAL SUPPORT (OUTPATIENT)
Dept: CARDIOLOGY CLINIC | Facility: CLINIC | Age: 69
End: 2024-02-14
Payer: MEDICARE

## 2024-02-14 DIAGNOSIS — I48.0 PAF (PAROXYSMAL ATRIAL FIBRILLATION) (HCC): ICD-10-CM

## 2024-02-14 PROCEDURE — 93248 EXT ECG>7D<15D REV&INTERPJ: CPT | Performed by: INTERNAL MEDICINE

## 2024-03-19 ENCOUNTER — HOSPITAL ENCOUNTER (OUTPATIENT)
Dept: RADIOLOGY | Age: 69
Discharge: HOME | End: 2024-03-19
Payer: MEDICARE

## 2024-03-19 VITALS — HEIGHT: 66 IN | BODY MASS INDEX: 24.91 KG/M2 | WEIGHT: 155 LBS

## 2024-03-19 DIAGNOSIS — Z12.31 VISIT FOR SCREENING MAMMOGRAM: ICD-10-CM

## 2024-03-19 PROCEDURE — 77067 SCR MAMMO BI INCL CAD: CPT

## 2024-03-19 PROCEDURE — 77063 BREAST TOMOSYNTHESIS BI: CPT

## 2024-03-26 ENCOUNTER — OFFICE VISIT (OUTPATIENT)
Dept: CARDIOLOGY CLINIC | Facility: CLINIC | Age: 69
End: 2024-03-26
Payer: MEDICARE

## 2024-03-26 VITALS
BODY MASS INDEX: 25.02 KG/M2 | HEART RATE: 70 BPM | DIASTOLIC BLOOD PRESSURE: 60 MMHG | SYSTOLIC BLOOD PRESSURE: 90 MMHG | HEIGHT: 66 IN

## 2024-03-26 DIAGNOSIS — E78.5 HYPERLIPIDEMIA, UNSPECIFIED HYPERLIPIDEMIA TYPE: ICD-10-CM

## 2024-03-26 DIAGNOSIS — Z79.4 ENCOUNTER FOR LONG-TERM (CURRENT) USE OF INSULIN (HCC): ICD-10-CM

## 2024-03-26 DIAGNOSIS — I48.0 PAROXYSMAL ATRIAL FIBRILLATION (HCC): Primary | ICD-10-CM

## 2024-03-26 PROCEDURE — G2211 COMPLEX E/M VISIT ADD ON: HCPCS | Performed by: INTERNAL MEDICINE

## 2024-03-26 PROCEDURE — 99215 OFFICE O/P EST HI 40 MIN: CPT | Performed by: INTERNAL MEDICINE

## 2024-03-26 RX ORDER — METOPROLOL TARTRATE 50 MG/1
50 TABLET, FILM COATED ORAL EVERY 12 HOURS SCHEDULED
Qty: 180 TABLET | Refills: 3 | Status: SHIPPED | OUTPATIENT
Start: 2024-03-26

## 2024-03-26 RX ORDER — METOPROLOL TARTRATE 50 MG/1
50 TABLET, FILM COATED ORAL EVERY 12 HOURS SCHEDULED
Qty: 180 TABLET | Refills: 3 | Status: SHIPPED | OUTPATIENT
Start: 2024-03-26 | End: 2024-03-26 | Stop reason: SDUPTHER

## 2024-03-26 NOTE — PROGRESS NOTES
Cardiology Outpatient Follow-Up Note - Angi Souza 68 y.o. female MRN: [id number]      Assessment/Plan:  1. Paroxysmal atrial fibrillation (HCC)  Long discussion today > 40 minutes regarding risks/benefits of anticoagulation for her paroxysmal atrial fibrillation. We also discussed nature of AF, triggers, risk of paroxysms, stroke risk, etc.     Patient opting against therapeutic AC for now due to history of vitreous and retinal hemorrhage for which she would like input from her ophthalmologist. Will fax request for ophthalmology clearance to Dr. Rhett Landers MD of [facility].     If cleared by ophthalmology I recommend thromboembolic ppx with Apixaban 5 mg BID.  Continue metoprolol for rate control, reduce to 50 mg BID.     - Ambulatory referral to Cardiology    2. Hyperlipidemia, unspecified hyperlipidemia type  Continue atorvastatin 10 mg daily.       3. Encounter for long-term (current) use of insulin (HCC)  She has never been on insulin -- verified with patient  The EMR forced me to add this diagnosis      We will see Angi Souza back in 6 months for routine follow-up.    Subjective:     HPI: Angi Souza is a 68 y.o. year old female with PAF, HLD presenting for follow-up.     Seen by me in hospital 1/5/24 at which time she was pending lithotripsy for nephrolithiasis complicated by hematuria.   We did start Eliquis for thromboembolic ppx and she c/o frequent nosebleeds. She has not been taking.     She has rare palpitations but no symptoms like what prompted her hospitalization in January for AF.       Cardiac Testing:    Echo 1/5/24    Interpretation Summary         Left Ventricle: Left ventricular cavity size is normal. Wall thickness is normal. The left ventricular ejection fraction is 60% by visual estimation. Systolic function is normal. Wall motion is normal.    Right Ventricle: Right ventricular cavity size is normal. Systolic function is normal.    Mitral Valve: There is  "moderate annular calcification. There is mild regurgitation.    Tricuspid Valve: There is mild regurgitation. The right ventricular systolic pressure is mildly elevated. The estimated right ventricular syst     Zio 2/14/24 - 14 d    Patient had a min HR of 48 bpm, max HR of 185 bpm, and avg HR of 71 bpm.  Predominant underlying rhythm was Sinus Rhythm. 1 run of Ventricular Tachycardia  occurred lasting 11 beats with a max rate of 140 bpm (avg 124 bpm). 21  Supraventricular Tachycardia runs occurred, the run with the fastest interval lasting  9 beats with a max rate of 185 bpm, the longest lasting 12.8 secs with an avg rate  of 120 bpm. Some episodes of Supraventricular Tachycardia may be possible Atrial  Tachycardia with variable block. Isolated SVEs were rare (<1.0%), SVE Couplets  were rare (<1.0%), and SVE Triplets were rare (<1.0%). Isolated VEs were rare  (<1.0%), and no VE Couplets or VE Triplets were present.         EKGs, personally reviewed:  N/a    Relevant Labs & Results:    CMP 1/11/24 - K 4.6, Cr 0.78  CBC 1/5/24 - Hgb 10.7 g/dL  TSH 1/4/24 - 1.455      ROS:  Review of Systems:  Review of Systems    Objective:     Vitals:   Vitals:    03/26/24 1133   BP: 90/60   BP Location: Left arm   Patient Position: Sitting   Cuff Size: Standard   Pulse: 70   Height: 5' 6\" (1.676 m)    Body surface area is 1.79 meters squared.  Wt Readings from Last 3 Encounters:   03/19/24 70.3 kg (155 lb)   01/10/24 70.3 kg (155 lb)   01/05/24 70.2 kg (154 lb 12.2 oz)       Physical Exam:  General: Angi Souza is a well appearing female, in no acute distress, sitting comfortably  HEENT: moist mucous membranes, EOMI  Neck:  No JVD, supple, trachea midline  Cardiovascular: unremarkable S1/S2, regular rate and rhythm, no murmurs, rubs or gallops  Pulmonary: normal respiratory effort, CTAB  Abdomen: soft and nondistended  Extremities: No lower extremity edema. Warm and well perfused extremities.  Neuro: no focal motor deficits, " "AAOx3 (person, place, time)  Psych: Normal mood and affect, cooperative      Medications (at the START of this encounter):  Outpatient Medications Prior to Visit   Medication Sig Dispense Refill    atorvastatin (LIPITOR) 10 mg tablet daily      Coenzyme Q10 (COQ-10 PO) CoQ-10      folic acid (FOLVITE) 1 mg tablet Take 1 tablet by mouth every morning      methotrexate 2.5 mg tablet methotrexate 2.5 mg tablet   2 tablet 1 x weekly      metoprolol tartrate (LOPRESSOR) 50 mg tablet Metoprolol tartrate 50mg in am and Metoprolol tartrate 75mg in pm 90 tablet 2    Multiple Vitamin (MULTIVITAMIN ADULT PO) Take 1 Dose by mouth      apixaban (Eliquis) 5 mg Take 1 tablet (5 mg total) by mouth 2 (two) times a day (Patient not taking: Reported on 1/10/2024) 60 tablet 0     No facility-administered medications prior to visit.                 Time Spent:  Total time (face-to-face and non-face-to-face) spent on today's visit was 50 minutes. This includes preparation for the visits (i.e. reviewing test results), performance of a medically appropriate history and examination, and orders for medications, tests or other procedures. This time is exclusive of procedures performed and time spent teaching.    BILLING BASED ON TIME    This note was completed in part utilizing Dragon [employer] voice recognition software. Grammatical errors, random word insertion, spelling mistakes, occasional wrong word or \"sound-alike\" substitutions and incomplete sentences may be an occasional consequence of the system secondary to software limitations, ambient noise and hardware issues. At the time of dictation, efforts were made to edit, clarify and /or correct errors.  Please read the chart carefully and recognize, using context, where substitutions have occurred.  If you have any questions or concerns about the context, text or information contained within the body of this dictation, please contact myself, the provider, for further clarification.    "

## 2024-03-26 NOTE — LETTER
March 26, 2024     Rhett Landers MD  [address]  [address]  [facility] [address]    Patient: Angi Souza   YOB: 1955   Date of Visit: 3/26/2024       Dear Dr. Landers:    Patient follows with your for history of prior vitreous and/or retinal  hemorrhage.  She has paroxysmal atrial fibrillation which is an indication for therapeutic oral anticoagulation. The preferred agent is apixaban 5 mg by mouth twice daily.  Please provide guidance as to whether her ophthalmologic history is or is not a contraindication to anticoagulation therapy.    Sincerely,        Patrice Moore MD        CC: No Recipients  
n/a

## 2024-03-28 ENCOUNTER — PATIENT MESSAGE (OUTPATIENT)
Dept: CARDIOLOGY CLINIC | Facility: CLINIC | Age: 69
End: 2024-03-28

## 2024-03-28 DIAGNOSIS — I48.0 PAROXYSMAL ATRIAL FIBRILLATION (HCC): Primary | ICD-10-CM

## 2024-08-11 ENCOUNTER — HOSPITAL ENCOUNTER (EMERGENCY)
Facility: HOSPITAL | Age: 69
Discharge: HOME | End: 2024-08-11
Attending: EMERGENCY MEDICINE
Payer: MEDICARE

## 2024-08-11 VITALS
TEMPERATURE: 97.9 F | OXYGEN SATURATION: 97 % | DIASTOLIC BLOOD PRESSURE: 72 MMHG | RESPIRATION RATE: 12 BRPM | HEART RATE: 96 BPM | SYSTOLIC BLOOD PRESSURE: 113 MMHG

## 2024-08-11 DIAGNOSIS — I48.0 PAROXYSMAL ATRIAL FIBRILLATION (HCC): Primary | ICD-10-CM

## 2024-08-11 LAB
ANION GAP SERPL CALCULATED.3IONS-SCNC: 7 MMOL/L (ref 4–13)
ATRIAL RATE: 80 BPM
BASOPHILS # BLD AUTO: 0.06 THOUSANDS/ÂΜL (ref 0–0.1)
BASOPHILS NFR BLD AUTO: 1 % (ref 0–1)
BUN SERPL-MCNC: 18 MG/DL (ref 5–25)
CA-I BLD-SCNC: 1.08 MMOL/L (ref 1.12–1.32)
CALCIUM SERPL-MCNC: 9.1 MG/DL (ref 8.4–10.2)
CHLORIDE SERPL-SCNC: 108 MMOL/L (ref 96–108)
CO2 SERPL-SCNC: 24 MMOL/L (ref 21–32)
CREAT SERPL-MCNC: 0.75 MG/DL (ref 0.6–1.3)
EOSINOPHIL # BLD AUTO: 0.04 THOUSAND/ÂΜL (ref 0–0.61)
EOSINOPHIL NFR BLD AUTO: 1 % (ref 0–6)
ERYTHROCYTE [DISTWIDTH] IN BLOOD BY AUTOMATED COUNT: 13.9 % (ref 11.6–15.1)
GFR SERPL CREATININE-BSD FRML MDRD: 81 ML/MIN/1.73SQ M
GLUCOSE SERPL-MCNC: 100 MG/DL (ref 65–140)
HCT VFR BLD AUTO: 39.6 % (ref 34.8–46.1)
HGB BLD-MCNC: 13.4 G/DL (ref 11.5–15.4)
IMM GRANULOCYTES # BLD AUTO: 0.02 THOUSAND/UL (ref 0–0.2)
IMM GRANULOCYTES NFR BLD AUTO: 0 % (ref 0–2)
LYMPHOCYTES # BLD AUTO: 2.05 THOUSANDS/ÂΜL (ref 0.6–4.47)
LYMPHOCYTES NFR BLD AUTO: 32 % (ref 14–44)
MAGNESIUM SERPL-MCNC: 2.1 MG/DL (ref 1.9–2.7)
MCH RBC QN AUTO: 32.7 PG (ref 26.8–34.3)
MCHC RBC AUTO-ENTMCNC: 33.8 G/DL (ref 31.4–37.4)
MCV RBC AUTO: 97 FL (ref 82–98)
MONOCYTES # BLD AUTO: 0.6 THOUSAND/ÂΜL (ref 0.17–1.22)
MONOCYTES NFR BLD AUTO: 9 % (ref 4–12)
NEUTROPHILS # BLD AUTO: 3.61 THOUSANDS/ÂΜL (ref 1.85–7.62)
NEUTS SEG NFR BLD AUTO: 57 % (ref 43–75)
NRBC BLD AUTO-RTO: 0 /100 WBCS
PHOSPHATE SERPL-MCNC: 3.8 MG/DL (ref 2.3–4.1)
PLATELET # BLD AUTO: 204 THOUSANDS/UL (ref 149–390)
PMV BLD AUTO: 9.2 FL (ref 8.9–12.7)
POTASSIUM SERPL-SCNC: 4.8 MMOL/L (ref 3.5–5.3)
QRS AXIS: 68 DEGREES
QRSD INTERVAL: 86 MS
QT INTERVAL: 340 MS
QTC INTERVAL: 436 MS
RBC # BLD AUTO: 4.1 MILLION/UL (ref 3.81–5.12)
SODIUM SERPL-SCNC: 139 MMOL/L (ref 135–147)
T WAVE AXIS: 49 DEGREES
VENTRICULAR RATE: 99 BPM
WBC # BLD AUTO: 6.38 THOUSAND/UL (ref 4.31–10.16)

## 2024-08-11 PROCEDURE — 84100 ASSAY OF PHOSPHORUS: CPT

## 2024-08-11 PROCEDURE — 96365 THER/PROPH/DIAG IV INF INIT: CPT

## 2024-08-11 PROCEDURE — 36415 COLL VENOUS BLD VENIPUNCTURE: CPT

## 2024-08-11 PROCEDURE — 80048 BASIC METABOLIC PNL TOTAL CA: CPT

## 2024-08-11 PROCEDURE — 83735 ASSAY OF MAGNESIUM: CPT

## 2024-08-11 PROCEDURE — 93005 ELECTROCARDIOGRAM TRACING: CPT

## 2024-08-11 PROCEDURE — 93010 ELECTROCARDIOGRAM REPORT: CPT | Performed by: INTERNAL MEDICINE

## 2024-08-11 PROCEDURE — 85025 COMPLETE CBC W/AUTO DIFF WBC: CPT

## 2024-08-11 PROCEDURE — 99285 EMERGENCY DEPT VISIT HI MDM: CPT

## 2024-08-11 PROCEDURE — 82330 ASSAY OF CALCIUM: CPT

## 2024-08-11 PROCEDURE — 99285 EMERGENCY DEPT VISIT HI MDM: CPT | Performed by: EMERGENCY MEDICINE

## 2024-08-11 RX ORDER — CALCIUM GLUCONATE 20 MG/ML
1 INJECTION, SOLUTION INTRAVENOUS ONCE
Status: COMPLETED | OUTPATIENT
Start: 2024-08-11 | End: 2024-08-11

## 2024-08-11 RX ADMIN — CALCIUM GLUCONATE 1 G: 20 INJECTION, SOLUTION INTRAVENOUS at 22:04

## 2024-08-12 NOTE — ED ATTENDING ATTESTATION
-- DO NOT REPLY / DO NOT REPLY ALL --  -- Message is from the [facility]--    General Patient Message      Reason for Call: Pharmacy is requesting to change the prescription to a 90 day supply for the  One Touch 33g Lancet & the   insulin aspart (NovoLOG FlexPen) 100 UNIT/ML pen-injector    Caller Information       Type Contact Phone    09/17/2021 07:16 AM CDT Phone (Incoming) [facility] @ [facility] (Provider) [phone]     REF# [id number]          Alternative phone number: No    Turnaround time given to caller:   \"This message will be sent to [state Provider's name]. The clinical team will fulfill your request as soon as they review your message.\"     8/11/2024  I, Jessee Houser DO, saw and evaluated the patient. I have discussed the patient with the resident/non-physician practitioner and agree with the resident's/non-physician practitioner's findings, Plan of Care, and MDM as documented in the resident's/non-physician practitioner's note, except where noted. All available labs and Radiology studies were reviewed.  I was present for key portions of any procedure(s) performed by the resident/non-physician practitioner and I was immediately available to provide assistance.       At this point I agree with the current assessment done in the Emergency Department.  I have conducted an independent evaluation of this patient a history and physical is as follows:    68-year-old female history of paroxysmal atrial fibrillation presents for palpitations.  Felt that she may have go into A-fib a few hours possibly days ago.  Took extra metoprolol today.  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  Denies any chest pain shortness of breath.  Relatively asymptomatic.  Currently rate controlled EKG interpreted by me as normal rate atrial fibrillation.  Plan cardiac workup, if continues to be rate controlled follow-up with cardiology    ED Course         Critical Care Time  Procedures

## 2024-08-12 NOTE — DISCHARGE INSTRUCTIONS
You were seen in the Emergency Department today for heart palpations. Your labs were within normal limits aside from slightly low calcium.     Please follow up with your cardiologist within a week for re-evaluation and further management.  Please return to the Emergency Department if you experience worsening of your current symptoms or any other concerning symptoms including chest pain, shortness of breath, dizziness.

## 2024-08-12 NOTE — ED PROVIDER NOTES
History  Chief Complaint   Patient presents with    Atrial Fibrillation     PT was recently dx with afib last winter and today states she has chest discomfort. PT states she feels like her heart is irregular but denies pain for SOB.       HPI    Patient is a 68 y.o. female with PMHx atrial fibrillation, hyperlipidemia  who presents to the ED via private vehicle for evaluation of heart palpitations.  Patient reports developing her medications around 5 to 6 PM today.  Patient states that she can usually tell when she goes into A-fib.  Reports being diagnosed with A-fib in January of this year and has been compliant on her metoprolol.  Patient states that she has a prescription for Eliquis but opted to not take it due to history of vitreous and retinal hemorrhages.  Patient denies any recent illness.  Denies any fever, chills, cough, congestion, chest pain, shortness of breath, abdominal pain, nausea, vomiting, diarrhea, dysuria, hematuria.  Patient reports taking an extra half dose of her metoprolol after developing heart palpitations.      Prior to Admission Medications   Prescriptions Last Dose Informant Patient Reported? Taking?   Coenzyme Q10 (COQ-10 PO)  Self Yes No   Sig: CoQ-10   Multiple Vitamin (MULTIVITAMIN ADULT PO)  Self Yes No   Sig: Take 1 Dose by mouth   apixaban (Eliquis) 5 mg   No No   Sig: Take 1 tablet (5 mg total) by mouth 2 (two) times a day   atorvastatin (LIPITOR) 10 mg tablet  Self Yes No   Sig: daily   folic acid (FOLVITE) 1 mg tablet  Self Yes No   Sig: Take 1 tablet by mouth every morning   methotrexate 2.5 mg tablet  Self Yes No   Sig: methotrexate 2.5 mg tablet   2 tablet 1 x weekly   metoprolol tartrate (LOPRESSOR) 50 mg tablet   No No   Sig: Take 1 tablet (50 mg total) by mouth every 12 (twelve) hours      Facility-Administered Medications: None       Past Medical History:   Diagnosis Date    Arthritis     Heart murmur     Hyperlipemia     Skin cancer     all 3 types       Past Surgical  History:   Procedure Laterality Date    BREAST EXCISIONAL BIOPSY Left     approximately      SECTION      x2    DILATION AND CURETTAGE OF UTERUS      LAMINECTOMY      SKIN CANCER EXCISION      WISDOM TOOTH EXTRACTION         Family History   Problem Relation Age of Onset    No Known Problems Mother     No Known Problems Father     No Known Problems Daughter     No Known Problems Maternal Grandmother     Prostate cancer Maternal Grandfather 80    Leukemia Maternal Grandfather 80    No Known Problems Paternal Grandmother     No Known Problems Paternal Grandfather     No Known Problems Son     No Known Problems Maternal Aunt     No Known Problems Maternal Aunt     No Known Problems Paternal Aunt     No Known Problems Paternal Aunt     Lymphoma Brother 35     I have reviewed and agree with the history as documented.    E-Cigarette/Vaping    E-Cigarette Use Never User      E-Cigarette/Vaping Substances     Social History     Tobacco Use    Smoking status: Never    Smokeless tobacco: Never   Vaping Use    Vaping status: Never Used   Substance Use Topics    Alcohol use: Yes     Comment: occasionally    Drug use: Not Currently        Review of Systems   Cardiovascular:  Positive for palpitations.       Physical Exam  ED Triage Vitals [24]   Temperature Pulse Respirations Blood Pressure SpO2   97.9 °F (36.6 °C) 104 18 140/89 98 %      Temp src Heart Rate Source Patient Position - Orthostatic VS BP Location FiO2 (%)   -- Monitor -- -- --      Pain Score       --             Orthostatic Vital Signs  Vitals:    24 2100   BP: 140/89 113/72   Pulse: 104 96       Physical Exam  Vitals and nursing note reviewed.   Constitutional:       General: She is not in acute distress.     Appearance: Normal appearance. She is well-developed. She is not ill-appearing, toxic-appearing or diaphoretic.   HENT:      Head: Normocephalic and atraumatic.      Mouth/Throat:      Mouth: Mucous membranes  are moist.      Pharynx: Oropharynx is clear.   Eyes:      Conjunctiva/sclera: Conjunctivae normal.   Cardiovascular:      Rate and Rhythm: Normal rate. Rhythm irregular.      Heart sounds: No murmur heard.  Pulmonary:      Effort: Pulmonary effort is normal. No respiratory distress.      Breath sounds: Normal breath sounds. No stridor. No wheezing, rhonchi or rales.   Abdominal:      Palpations: Abdomen is soft.      Tenderness: There is no abdominal tenderness.   Musculoskeletal:         General: No swelling or deformity. Normal range of motion.      Cervical back: Normal range of motion and neck supple. No rigidity.   Skin:     General: Skin is warm and dry.      Capillary Refill: Capillary refill takes less than 2 seconds.   Neurological:      General: No focal deficit present.      Mental Status: She is alert and oriented to person, place, and time.   Psychiatric:         Mood and Affect: Mood normal.         ED Medications  Medications   calcium gluconate 1 g in sodium chloride 0.9% 50 mL (premix) (0 g Intravenous Stopped 8/11/24 2234)       Diagnostic Studies  Results Reviewed       Procedure Component Value Units Date/Time    Basic metabolic panel [[id number]] Collected: 08/11/24 2138    Lab Status: Final result Specimen: Blood from Arm, Left Updated: 08/11/24 2201     Sodium 139 mmol/L      Potassium 4.8 mmol/L      Chloride 108 mmol/L      CO2 24 mmol/L      ANION GAP 7 mmol/L      BUN 18 mg/dL      Creatinine 0.75 mg/dL      Glucose 100 mg/dL      Calcium 9.1 mg/dL      eGFR 81 ml/min/1.73sq m     Narrative:      National Kidney Disease Foundation guidelines for Chronic Kidney Disease (CKD):     Stage 1 with normal or high GFR (GFR > 90 mL/min/1.73 square meters)    Stage 2 Mild CKD (GFR = 60-89 mL/min/1.73 square meters)    Stage 3A Moderate CKD (GFR = 45-59 mL/min/1.73 square meters)    Stage 3B Moderate CKD (GFR = 30-44 mL/min/1.73 square meters)    Stage 4 Severe CKD (GFR = 15-29 mL/min/1.73 square  meters)    Stage 5 End Stage CKD (GFR <15 mL/min/1.73 square meters)  Note: GFR calculation is accurate only with a steady state creatinine    Magnesium [[id number]]  (Normal) Collected: 08/11/24 2138    Lab Status: Final result Specimen: Blood from Arm, Left Updated: 08/11/24 2201     Magnesium 2.1 mg/dL     Phosphorus [[id number]]  (Normal) Collected: 08/11/24 2138    Lab Status: Final result Specimen: Blood from Arm, Left Updated: 08/11/24 2201     Phosphorus 3.8 mg/dL     Calcium, ionized [[id number]]  (Abnormal) Collected: 08/11/24 2138    Lab Status: Final result Specimen: Blood from Arm, Left Updated: 08/11/24 2149     Calcium, Ionized 1.08 mmol/L     CBC and differential [[id number]] Collected: 08/11/24 2138    Lab Status: Final result Specimen: Blood from Arm, Left Updated: 08/11/24 2145     WBC 6.38 Thousand/uL      RBC 4.10 Million/uL      Hemoglobin 13.4 g/dL      Hematocrit 39.6 %      MCV 97 fL      MCH 32.7 pg      MCHC 33.8 g/dL      RDW 13.9 %      MPV 9.2 fL      Platelets 204 Thousands/uL      nRBC 0 /100 WBCs      Segmented % 57 %      Immature Grans % 0 %      Lymphocytes % 32 %      Monocytes % 9 %      Eosinophils Relative 1 %      Basophils Relative 1 %      Absolute Neutrophils 3.61 Thousands/µL      Absolute Immature Grans 0.02 Thousand/uL      Absolute Lymphocytes 2.05 Thousands/µL      Absolute Monocytes 0.60 Thousand/µL      Eosinophils Absolute 0.04 Thousand/µL      Basophils Absolute 0.06 Thousands/µL                    No orders to display         Procedures  ECG 12 Lead Documentation Only    Date/Time: 8/11/2024 8:03 PM    Performed by: Le Desai MD  Authorized by: Le Desai MD    Indications / Diagnosis:  Afib  ECG reviewed by me, the ED Provider: yes    Patient location:  ED  Previous ECG:     Previous ECG:  Compared to current  Rate:     ECG rate:  99  Rhythm:     Rhythm: atrial fibrillation    QRS:     QRS axis:  Normal    QRS intervals:  Normal  ST segments:     ST segments:   Normal        ED Course  ED Course as of 08/12/24 0450   Sun Aug 11, 2024   2054 Blood Pressure: 140/89   2054 Pulse: 104   2054 Respirations: 18   2054 SpO2: 98 %   2148 WBC: 6.38  wnl   2148 Hemoglobin: 13.4  No anemia    2148 Platelet Count: 204  wnl   2201 Calcium, Ionized(!): 1.08  1g calcium gluconate ordered    2202 MAGNESIUM: 2.1  wnl   2202 Phosphorus: 3.8  wnl                             SBIRT 20yo+      Flowsheet Row Most Recent Value   Initial Alcohol Screen: US AUDIT-C     1. How often do you have a drink containing alcohol? 0 Filed at: 08/11/2024 2005   2. How many drinks containing alcohol do you have on a typical day you are drinking?  0 Filed at: 08/11/2024 2005   3a. Male UNDER 65: How often do you have five or more drinks on one occasion? 0 Filed at: 08/11/2024 2005   3b. FEMALE Any Age, or MALE 65+: How often do you have 4 or more drinks on one occassion? 0 Filed at: 08/11/2024 2005   Audit-C Score 0 Filed at: 08/11/2024 2005   ELLE: How many times in the past year have you...    Used an illegal drug or used a prescription medication for non-medical reasons? Never Filed at: 08/11/2024 2005                  Medical Decision Making  Amount and/or Complexity of Data Reviewed  Labs: ordered. Decision-making details documented in ED Course.    Risk  Prescription drug management.      ASSESSMENT: Patient is a 68 y.o. female who presents with heart palpations in setting of history of pAF.   DDX includes but not limited to: electrolyte abnormality, arrhythmia, afib w RVR, dehydration.   PLAN: CBC, BMP, mag, phos, ical, EKG. Treated with calcium IV.    Stable for discharge. Strict return to ED precautions provided. Advised to follow up with cardiologist within 2-3 days for re-evaluation and further management. Patient verbalized understanding and agrees with the plan of care.         Disposition  Final diagnoses:   Paroxysmal atrial fibrillation (HCC)     Time reflects when diagnosis was documented in  both MDM as applicable and the Disposition within this note       Time User Action Codes Description Comment    8/11/2024 10:40 PM Le Desai Add [I48.0] Paroxysmal atrial fibrillation (HCC)           ED Disposition       ED Disposition   Discharge    Condition   Stable    Date/Time   Sun Aug 11, 2024 2239    Comment   Angi Souza discharge to home/self care.                   Follow-up Information       Follow up With Specialties Details Why Contact Info Additional Information    Sonia Shukla MD Internal Medicine   [address] [place] [id number]  [phone]       [facility] Emergency Department Emergency Medicine  If symptoms worsen [address] [facility] [address]  [phone] [facility] Emergency Department, [address] [place], [place], [address]   [phone]    Patrice Moore MD Cardiology   [address] [facility] [id number] [phone]               Discharge Medication List as of 8/11/2024 10:42 PM        CONTINUE these medications which have NOT CHANGED    Details   apixaban (Eliquis) 5 mg Take 1 tablet (5 mg total) by mouth 2 (two) times a day, Starting Thu 3/28/2024, Normal      atorvastatin (LIPITOR) 10 mg tablet daily, Starting Mon 7/8/2019, Historical Med      Coenzyme Q10 (COQ-10 PO) CoQ-10, Historical Med      folic acid (FOLVITE) 1 mg tablet Take 1 tablet by mouth every morning, Historical Med      methotrexate 2.5 mg tablet methotrexate 2.5 mg tablet   2 tablet 1 x weekly, Historical Med      metoprolol tartrate (LOPRESSOR) 50 mg tablet Take 1 tablet (50 mg total) by mouth every 12 (twelve) hours, Starting Tue 3/26/2024, Normal      Multiple Vitamin (MULTIVITAMIN ADULT PO) Take 1 Dose by mouth, Historical Med           No discharge procedures on file.    PDMP Review       None             ED Provider  Attending physically available and evaluated Angi ALISHA Souza. I managed the patient along with the  ED Attending.    Electronically Signed by           Le Desai MD  08/12/24 7530

## 2024-09-16 ENCOUNTER — OFFICE VISIT (OUTPATIENT)
Dept: CARDIOLOGY CLINIC | Facility: CLINIC | Age: 69
End: 2024-09-16
Payer: MEDICARE

## 2024-09-16 VITALS
HEIGHT: 66 IN | HEART RATE: 63 BPM | DIASTOLIC BLOOD PRESSURE: 68 MMHG | SYSTOLIC BLOOD PRESSURE: 122 MMHG | BODY MASS INDEX: 27.5 KG/M2 | WEIGHT: 171.1 LBS

## 2024-09-16 DIAGNOSIS — I48.0 PAROXYSMAL ATRIAL FIBRILLATION (HCC): Primary | ICD-10-CM

## 2024-09-16 DIAGNOSIS — E78.5 HYPERLIPIDEMIA, UNSPECIFIED HYPERLIPIDEMIA TYPE: ICD-10-CM

## 2024-09-16 PROCEDURE — 99214 OFFICE O/P EST MOD 30 MIN: CPT | Performed by: STUDENT IN AN ORGANIZED HEALTH CARE EDUCATION/TRAINING PROGRAM

## 2024-09-16 PROCEDURE — 93000 ELECTROCARDIOGRAM COMPLETE: CPT | Performed by: STUDENT IN AN ORGANIZED HEALTH CARE EDUCATION/TRAINING PROGRAM

## 2024-09-16 RX ORDER — METOPROLOL SUCCINATE 50 MG/1
50 TABLET, EXTENDED RELEASE ORAL 2 TIMES DAILY
Qty: 60 TABLET | Refills: 3 | Status: SHIPPED | OUTPATIENT
Start: 2024-09-16
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
## 2024-10-14 DIAGNOSIS — I48.0 PAROXYSMAL ATRIAL FIBRILLATION (HCC): ICD-10-CM

## 2024-10-14 RX ORDER — METOPROLOL SUCCINATE 50 MG/1
50 TABLET, EXTENDED RELEASE ORAL 2 TIMES DAILY
Qty: 180 TABLET | Refills: 3 | Status: SHIPPED | OUTPATIENT
Start: 2024-10-14
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
## 2024-12-03 ENCOUNTER — OFFICE VISIT (OUTPATIENT)
Dept: CARDIOLOGY CLINIC | Facility: CLINIC | Age: 69
End: 2024-12-03
Payer: MEDICARE

## 2024-12-03 VITALS
BODY MASS INDEX: 27.63 KG/M2 | HEIGHT: 66 IN | WEIGHT: 171.9 LBS | SYSTOLIC BLOOD PRESSURE: 116 MMHG | DIASTOLIC BLOOD PRESSURE: 66 MMHG | HEART RATE: 63 BPM

## 2024-12-03 DIAGNOSIS — I48.0 PAROXYSMAL ATRIAL FIBRILLATION (HCC): Primary | ICD-10-CM

## 2024-12-03 DIAGNOSIS — R00.2 PALPITATIONS: ICD-10-CM

## 2024-12-03 DIAGNOSIS — E78.5 HYPERLIPIDEMIA, UNSPECIFIED HYPERLIPIDEMIA TYPE: ICD-10-CM

## 2024-12-03 PROCEDURE — 99214 OFFICE O/P EST MOD 30 MIN: CPT | Performed by: STUDENT IN AN ORGANIZED HEALTH CARE EDUCATION/TRAINING PROGRAM

## 2024-12-03 PROCEDURE — 93000 ELECTROCARDIOGRAM COMPLETE: CPT | Performed by: STUDENT IN AN ORGANIZED HEALTH CARE EDUCATION/TRAINING PROGRAM

## 2024-12-17 NOTE — PROGRESS NOTES
Name: Angi Souza      : 1955      MRN: [id number]  Encounter Provider: Corrina Roque PA-C  Encounter Date: 2024   Encounter department: [facility] RHEUMATOLOGY [facility]  :  Assessment & Plan  Inflammatory arthritis  History of seronegative inflammatory arthritis currently being treated with oral methotrexate.  She has noted more pain and stiffness in her hands over the last few months.  I have recommended a musculoskeletal ultrasound to assess for inflammatory arthritis activity.  I would also like to check her rheumatoid factor and CCP antibodies along with inflammatory factors.  Other labs as ordered to monitor for medication side effects and toxicity.  Plan follow-up in 3 months or sooner if needed.    Orders:    CBC and differential; Standing    Comprehensive metabolic panel; Standing    Sedimentation rate, automated; Standing    C-reactive protein; Standing    RF Screen w/ Reflex to Titer; Future    Cyclic citrul peptide antibody, IgG; Future    US MSK limited; Future    TSH w/Reflex; Future    Post-menopausal  Recommend baseline DEXA scan to assess bone density.    Orders:    DXA bone density spine hip and pelvis; Future    Vitamin D deficiency    Orders:    Vitamin D 25 hydroxy; Future    Encounter for long-term (current) use of medications    Orders:    CBC and differential; Standing    Comprehensive metabolic panel; Standing    Sedimentation rate, automated; Standing    C-reactive protein; Standing        History of Present Illness   Angi Souza is a 69 y.o. female.  She is here for new patient valuation for history of inflammatory arthritis.  She is a previous patient of Dr. Funes.  She is currently being treated with oral methotrexate and taking 10 mg once weekly.  Over the last few months she has noticed more pain and tightness in her hands.  She has difficulty making a fist.  She is very active but does notice some increase stiffness with  "overuse.    She has a history of generalized osteoarthritis along with lumbar degenerative disc disease.  She has a history of a DVT.  She has a history of kidney stones.  She follows with cardiology for history of paroxysmal atrial fibrillation along with hyperlipidemia.            Review of Systems   Constitutional:  Negative for chills, fatigue and fever.   HENT:  Negative for hearing loss, sore throat and tinnitus.    Eyes:  Negative for pain and visual disturbance.        Dry eyes   Respiratory:  Negative for cough and shortness of breath.    Cardiovascular:  Negative for chest pain and palpitations.   Gastrointestinal:  Negative for abdominal pain, nausea and vomiting.   Genitourinary:  Negative for difficulty urinating.   Musculoskeletal:  Positive for arthralgias and joint swelling. Negative for back pain, gait problem, myalgias, neck pain and neck stiffness.   Skin:  Negative for rash.   Neurological:  Negative for dizziness, seizures, weakness, numbness and headaches.   Psychiatric/Behavioral:  Negative for confusion, decreased concentration and sleep disturbance.      Current Outpatient Medications on File Prior to Visit   Medication Sig Dispense Refill    atorvastatin (LIPITOR) 10 mg tablet daily      Coenzyme Q10 (COQ-10 PO) CoQ-10      folic acid (FOLVITE) 1 mg tablet Take 1 tablet by mouth every morning      methotrexate 2.5 mg tablet methotrexate 2.5 mg tablet   2 tablet 1 x weekly      metoprolol succinate (TOPROL-XL) 50 mg 24 hr tablet Take 1 tablet (50 mg total) by mouth 2 (two) times a day 180 tablet 3    Multiple Vitamin (MULTIVITAMIN ADULT PO) Take 1 Dose by mouth       No current facility-administered medications on file prior to visit.         Objective   /66   Pulse 68   Temp (!) 97.1 °F (36.2 °C) (Temporal)   Ht 5' 6\" (1.676 m)   Wt 77.7 kg (171 lb 6.4 oz)   SpO2 98%   BMI 27.66 kg/m²      Physical Exam  Constitutional:       Appearance: Normal appearance.   Cardiovascular:      " Rate and Rhythm: Normal rate and regular rhythm.   Pulmonary:      Breath sounds: Normal breath sounds.   Musculoskeletal:         General: Swelling (Soft tissue prominence left index finger PIP and DIP joints) present. No tenderness.   Skin:     General: Skin is warm and dry.   Neurological:      General: No focal deficit present.      Mental Status: She is alert.           Dragon Dictation software was used to dictate this note. It may contain errors with dictating incorrect words/spelling. Please contact provider directly for any questions.

## 2024-12-19 ENCOUNTER — OFFICE VISIT (OUTPATIENT)
Age: 69
End: 2024-12-19
Payer: MEDICARE

## 2024-12-19 VITALS
DIASTOLIC BLOOD PRESSURE: 66 MMHG | HEIGHT: 66 IN | WEIGHT: 171.4 LBS | BODY MASS INDEX: 27.55 KG/M2 | HEART RATE: 68 BPM | SYSTOLIC BLOOD PRESSURE: 108 MMHG | TEMPERATURE: 97.1 F | OXYGEN SATURATION: 98 %

## 2024-12-19 DIAGNOSIS — Z79.899 ENCOUNTER FOR LONG-TERM (CURRENT) USE OF MEDICATIONS: ICD-10-CM

## 2024-12-19 DIAGNOSIS — Z78.0 POST-MENOPAUSAL: ICD-10-CM

## 2024-12-19 DIAGNOSIS — E55.9 VITAMIN D DEFICIENCY: ICD-10-CM

## 2024-12-19 DIAGNOSIS — M19.90 INFLAMMATORY ARTHRITIS: Primary | ICD-10-CM

## 2024-12-19 PROBLEM — N20.0 NEPHROLITHIASIS: Status: ACTIVE | Noted: 2023-12-15

## 2024-12-19 PROBLEM — I10 HTN (HYPERTENSION): Status: ACTIVE | Noted: 2023-11-25

## 2024-12-19 PROBLEM — M06.09 RHEUMATOID ARTHRITIS OF MULTIPLE SITES WITH NEGATIVE RHEUMATOID FACTOR (HCC): Status: ACTIVE | Noted: 2024-01-04

## 2024-12-19 PROCEDURE — 99204 OFFICE O/P NEW MOD 45 MIN: CPT | Performed by: PHYSICIAN ASSISTANT

## 2024-12-19 NOTE — ASSESSMENT & PLAN NOTE
Orders:    CBC and differential; Standing    Comprehensive metabolic panel; Standing    Sedimentation rate, automated; Standing    C-reactive protein; Standing    RF Screen w/ Reflex to Titer; Future    Cyclic citrul peptide antibody, IgG; Future    US MSK limited; Future    TSH w/Reflex; Future

## 2024-12-19 NOTE — ASSESSMENT & PLAN NOTE
History of seronegative inflammatory arthritis currently being treated with oral methotrexate.  She has noted more pain and stiffness in her hands over the last few months.  I have recommended a musculoskeletal ultrasound to assess for inflammatory arthritis activity.  I would also like to check her rheumatoid factor and CCP antibodies along with inflammatory factors.  Other labs as ordered to monitor for medication side effects and toxicity.  Plan follow-up in 3 months or sooner if needed.    Orders:    CBC and differential; Standing    Comprehensive metabolic panel; Standing    Sedimentation rate, automated; Standing    C-reactive protein; Standing    RF Screen w/ Reflex to Titer; Future    Cyclic citrul peptide antibody, IgG; Future    US MSK limited; Future    TSH w/Reflex; Future

## 2025-01-10 ENCOUNTER — HOSPITAL ENCOUNTER (OUTPATIENT)
Dept: ULTRASOUND IMAGING | Facility: HOSPITAL | Age: 70
Discharge: HOME | End: 2025-01-10
Payer: MEDICARE

## 2025-01-10 DIAGNOSIS — M19.90 INFLAMMATORY ARTHRITIS: ICD-10-CM

## 2025-01-10 PROCEDURE — 76882 US LMTD JT/FCL EVL NVASC XTR: CPT

## 2025-01-14 ENCOUNTER — RESULTS FOLLOW-UP (OUTPATIENT)
Age: 70
End: 2025-01-14

## 2025-01-14 DIAGNOSIS — M19.90 INFLAMMATORY ARTHRITIS: Primary | ICD-10-CM

## 2025-01-14 RX ORDER — METHOTREXATE 2.5 MG/1
15 TABLET ORAL WEEKLY
Qty: 72 TABLET | Refills: 1 | Status: SHIPPED | OUTPATIENT
Start: 2025-01-14 | End: 2025-07-13

## 2025-01-14 NOTE — TELEPHONE ENCOUNTER
Medication: Methotrexate    Dose/Frequency: (15 mg) 6 tabs weekly    Quantity:     Pharmacy: [facility]    Office:   [] PCP/Provider -   [x] Speciality/Provider -     Does the patient have enough for 3 days?   [x] Yes   [] No - Send as HP to POD

## 2025-01-31 ENCOUNTER — HOSPITAL ENCOUNTER (EMERGENCY)
Facility: HOSPITAL | Age: 70
Discharge: HOME | End: 2025-01-31
Attending: EMERGENCY MEDICINE
Payer: MEDICARE

## 2025-01-31 ENCOUNTER — TELEPHONE (OUTPATIENT)
Age: 70
End: 2025-01-31

## 2025-01-31 VITALS
DIASTOLIC BLOOD PRESSURE: 82 MMHG | RESPIRATION RATE: 18 BRPM | TEMPERATURE: 97.4 F | SYSTOLIC BLOOD PRESSURE: 107 MMHG | OXYGEN SATURATION: 98 % | HEART RATE: 68 BPM

## 2025-01-31 DIAGNOSIS — I48.91 ATRIAL FIBRILLATION (HCC): Primary | ICD-10-CM

## 2025-01-31 DIAGNOSIS — R00.2 PALPITATIONS: ICD-10-CM

## 2025-01-31 LAB
ALBUMIN SERPL BCG-MCNC: 4.2 G/DL (ref 3.5–5)
ALP SERPL-CCNC: 111 U/L (ref 34–104)
ALT SERPL W P-5'-P-CCNC: 29 U/L (ref 7–52)
ANION GAP SERPL CALCULATED.3IONS-SCNC: 7 MMOL/L (ref 4–13)
AST SERPL W P-5'-P-CCNC: 18 U/L (ref 13–39)
ATRIAL RATE: 66 BPM
ATRIAL RATE: 69 BPM
BASOPHILS # BLD AUTO: 0.06 THOUSANDS/ΜL (ref 0–0.1)
BASOPHILS NFR BLD AUTO: 1 % (ref 0–1)
BILIRUB SERPL-MCNC: 0.44 MG/DL (ref 0.2–1)
BUN SERPL-MCNC: 16 MG/DL (ref 5–25)
CALCIUM SERPL-MCNC: 9.6 MG/DL (ref 8.4–10.2)
CARDIAC TROPONIN I PNL SERPL HS: <2 NG/L (ref ?–50)
CARDIAC TROPONIN I PNL SERPL HS: <2 NG/L (ref ?–50)
CHLORIDE SERPL-SCNC: 108 MMOL/L (ref 96–108)
CO2 SERPL-SCNC: 27 MMOL/L (ref 21–32)
CREAT SERPL-MCNC: 0.82 MG/DL (ref 0.6–1.3)
EOSINOPHIL # BLD AUTO: 0.09 THOUSAND/ΜL (ref 0–0.61)
EOSINOPHIL NFR BLD AUTO: 2 % (ref 0–6)
ERYTHROCYTE [DISTWIDTH] IN BLOOD BY AUTOMATED COUNT: 13.3 % (ref 11.6–15.1)
GFR SERPL CREATININE-BSD FRML MDRD: 72 ML/MIN/1.73SQ M
GLUCOSE SERPL-MCNC: 100 MG/DL (ref 65–140)
HCT VFR BLD AUTO: 42.5 % (ref 34.8–46.1)
HGB BLD-MCNC: 14.5 G/DL (ref 11.5–15.4)
IMM GRANULOCYTES # BLD AUTO: 0.01 THOUSAND/UL (ref 0–0.2)
IMM GRANULOCYTES NFR BLD AUTO: 0 % (ref 0–2)
LYMPHOCYTES # BLD AUTO: 1.66 THOUSANDS/ΜL (ref 0.6–4.47)
LYMPHOCYTES NFR BLD AUTO: 30 % (ref 14–44)
MCH RBC QN AUTO: 32.2 PG (ref 26.8–34.3)
MCHC RBC AUTO-ENTMCNC: 34.1 G/DL (ref 31.4–37.4)
MCV RBC AUTO: 94 FL (ref 82–98)
MONOCYTES # BLD AUTO: 0.43 THOUSAND/ΜL (ref 0.17–1.22)
MONOCYTES NFR BLD AUTO: 8 % (ref 4–12)
NEUTROPHILS # BLD AUTO: 3.23 THOUSANDS/ΜL (ref 1.85–7.62)
NEUTS SEG NFR BLD AUTO: 59 % (ref 43–75)
NRBC BLD AUTO-RTO: 0 /100 WBCS
P AXIS: 46 DEGREES
P AXIS: 49 DEGREES
PLATELET # BLD AUTO: 234 THOUSANDS/UL (ref 149–390)
PMV BLD AUTO: 9.1 FL (ref 8.9–12.7)
POTASSIUM SERPL-SCNC: 4.4 MMOL/L (ref 3.5–5.3)
PR INTERVAL: 164 MS
PR INTERVAL: 172 MS
PROT SERPL-MCNC: 6.8 G/DL (ref 6.4–8.4)
QRS AXIS: 45 DEGREES
QRS AXIS: 52 DEGREES
QRS AXIS: 60 DEGREES
QRSD INTERVAL: 72 MS
QRSD INTERVAL: 80 MS
QRSD INTERVAL: 82 MS
QT INTERVAL: 342 MS
QT INTERVAL: 386 MS
QT INTERVAL: 388 MS
QTC INTERVAL: 397 MS
QTC INTERVAL: 407 MS
QTC INTERVAL: 414 MS
RBC # BLD AUTO: 4.5 MILLION/UL (ref 3.81–5.12)
SODIUM SERPL-SCNC: 142 MMOL/L (ref 135–147)
T WAVE AXIS: 60 DEGREES
T WAVE AXIS: 66 DEGREES
T WAVE AXIS: 68 DEGREES
TSH SERPL DL<=0.05 MIU/L-ACNC: 2.85 UIU/ML (ref 0.45–4.5)
VENTRICULAR RATE: 66 BPM
VENTRICULAR RATE: 69 BPM
VENTRICULAR RATE: 81 BPM
WBC # BLD AUTO: 5.48 THOUSAND/UL (ref 4.31–10.16)

## 2025-01-31 PROCEDURE — 85025 COMPLETE CBC W/AUTO DIFF WBC: CPT

## 2025-01-31 PROCEDURE — 36415 COLL VENOUS BLD VENIPUNCTURE: CPT

## 2025-01-31 PROCEDURE — 93005 ELECTROCARDIOGRAM TRACING: CPT

## 2025-01-31 PROCEDURE — 80053 COMPREHEN METABOLIC PANEL: CPT

## 2025-01-31 PROCEDURE — 84484 ASSAY OF TROPONIN QUANT: CPT

## 2025-01-31 PROCEDURE — 99285 EMERGENCY DEPT VISIT HI MDM: CPT

## 2025-01-31 PROCEDURE — 99284 EMERGENCY DEPT VISIT MOD MDM: CPT | Performed by: EMERGENCY MEDICINE

## 2025-01-31 PROCEDURE — 84443 ASSAY THYROID STIM HORMONE: CPT

## 2025-01-31 PROCEDURE — 93010 ELECTROCARDIOGRAM REPORT: CPT | Performed by: INTERNAL MEDICINE

## 2025-01-31 RX ORDER — ETOMIDATE 2 MG/ML
0.2 INJECTION INTRAVENOUS ONCE
Status: DISCONTINUED | OUTPATIENT
Start: 2025-01-31 | End: 2025-01-31

## 2025-01-31 NOTE — ED PROCEDURE NOTE
Procedure  Pre-Procedural Sedation    Performed by: Marcy Ng DO  Authorized by: Marcy Ng DO    Consent:     Consent obtained:  Written    Consent given by:  Patient    Risks discussed:  Allergic reaction, prolonged hypoxia resulting in organ damage, prolonged sedation necessitating reversal, dysrhythmia, inadequate sedation, respiratory compromise necessitating ventilatory assistance and intubation, vomiting and nausea  Universal protocol:     Patient identity confirmation method:  Verbally with patient  Indications:     Sedation purpose:  Cardioversion    Procedure necessitating sedation performed by:  Different physician (Dr. Marcy gN and Dr. Saroj Souza)    Intended level of sedation:  Moderate (conscious sedation)  Pre-sedation assessment:     Time since last food or drink:  45 minutes    ASA classification: class 2 - patient with mild systemic disease      Neck mobility: normal      Mouth opening:  3 or more finger widths    Thyromental distance:  4 finger widths    Mallampati score:  I - soft palate, uvula, fauces, pillars visible    Pre-sedation assessments completed and reviewed: airway patency, cardiovascular function, hydration status, mental status, nausea/vomiting, pain level, respiratory function and temperature      History of difficult intubation: no      Pre-sedation assessment completed:  1/31/2025 1:42 PM                   Marcy Ng DO  01/31/25 1340

## 2025-01-31 NOTE — ED PROVIDER NOTES
ED Disposition       None          Assessment & Plan   {Hyperlinks  Risk Stratification - NIHSS - HEART SCORE - Fill out sepsis note and make sure you call [phone] if severe or septic shock:[id number]}    MDM         Medications - No data to display    ED Risk Strat Scores                  Identification of Seniors at Risk      Flowsheet Row Most Recent Value   (ISAR) Identification of Seniors at Risk    Before the illness or injury that brought you to the Emergency, did you need someone to help you on a regular basis? 0 Filed at: 2025   In the last 24 hours, have you needed more help than usual? 0 Filed at: 2025   Have you been hospitalized for one or more nights during the past 6 months? 0 Filed at: 2025   In general, do you see well? 0 Filed at: 2025   In general, do you have serious problems with your memory? 0 Filed at: 2025   Do you take more than three different medications every day? 1 Filed at: 2025   ISAR Score 1 Filed at: 2025                                    History of Present Illness   {Hyperlinks  History (Med, Surg, Fam, Social) - Current Medications - Allergies  :[id number]}    Chief Complaint   Patient presents with    Atrial Fibrillation     Patient feels like she is in afib since dinner last night       Past Medical History:   Diagnosis Date    Arthritis     Heart murmur     Hyperlipemia     Skin cancer     all 3 types      Past Surgical History:   Procedure Laterality Date    BREAST EXCISIONAL BIOPSY Left     approximately      SECTION      x2    DILATION AND CURETTAGE OF UTERUS      LAMINECTOMY      SKIN CANCER EXCISION      WISDOM TOOTH EXTRACTION        Family History   Problem Relation Age of Onset    No Known Problems Mother     No Known Problems Father     No Known Problems Daughter     No Known Problems Maternal Grandmother     Prostate cancer Maternal Grandfather 80    Leukemia Maternal  Grandfather 80    No Known Problems Paternal Grandmother     No Known Problems Paternal Grandfather     No Known Problems Son     No Known Problems Maternal Aunt     No Known Problems Maternal Aunt     No Known Problems Paternal Aunt     No Known Problems Paternal Aunt     Lymphoma Brother 35      Social History     Tobacco Use    Smoking status: Never    Smokeless tobacco: Never   Vaping Use    Vaping status: Never Used   Substance Use Topics    Alcohol use: Yes     Alcohol/week: 1.0 standard drink of alcohol     Types: 1 Glasses of wine per week     Comment: occasionally    Drug use: Not Currently      E-Cigarette/Vaping    E-Cigarette Use Never User       E-Cigarette/Vaping Substances      I have reviewed and agree with the history as documented.     HPI    Review of Systems        Objective   {Hyperlinks  Historical Vitals - Historical Labs - Chart Review/Microbiology - Last Echo - Code Status  :[id number]}    ED Triage Vitals   Temperature Pulse Blood Pressure Respirations SpO2 Patient Position - Orthostatic VS   01/31/25 0937 01/31/25 0937 01/31/25 0937 01/31/25 0937 01/31/25 0937 01/31/25 1041   (!) 97.4 °F (36.3 °C) 98 131/84 16 98 % Sitting      Temp src Heart Rate Source BP Location FiO2 (%) Pain Score    -- 01/31/25 1158 01/31/25 1041 -- 01/31/25 0937     Monitor Left arm  No Pain      Vitals      Date and Time Temp Pulse SpO2 Resp BP Pain Score FACES Pain Rating User   01/31/25 1158 -- 68 98 % 18 107/82 No Pain -- KRR   01/31/25 1041 -- 78 97 % 16 99/58 No Pain -- KRR   01/31/25 0937 97.4 °F (36.3 °C) 98 98 % 16 131/84 No Pain -- AK            Physical Exam    Results Reviewed       Procedure Component Value Units Date/Time    HS Troponin I 2hr [[id number]] Collected: 01/31/25 1203    Lab Status: Final result Specimen: Blood from Arm, Left Updated: 01/31/25 1232     hs TnI 2hr <2 ng/L      Delta 2hr hsTnI --    HS Troponin I 4hr [[id number]]     Lab Status: No result Specimen: Blood     Comprehensive  metabolic panel [[id number]]  (Abnormal) Collected: 01/31/25 0947    Lab Status: Final result Specimen: Blood from Arm, Left Updated: 01/31/25 1017     Sodium 142 mmol/L      Potassium 4.4 mmol/L      Chloride 108 mmol/L      CO2 27 mmol/L      ANION GAP 7 mmol/L      BUN 16 mg/dL      Creatinine 0.82 mg/dL      Glucose 100 mg/dL      Calcium 9.6 mg/dL      AST 18 U/L      ALT 29 U/L      Alkaline Phosphatase 111 U/L      Total Protein 6.8 g/dL      Albumin 4.2 g/dL      Total Bilirubin 0.44 mg/dL      eGFR 72 ml/min/1.73sq m     Narrative:      National Kidney Disease Foundation guidelines for Chronic Kidney Disease (CKD):     Stage 1 with normal or high GFR (GFR > 90 mL/min/1.73 square meters)    Stage 2 Mild CKD (GFR = 60-89 mL/min/1.73 square meters)    Stage 3A Moderate CKD (GFR = 45-59 mL/min/1.73 square meters)    Stage 3B Moderate CKD (GFR = 30-44 mL/min/1.73 square meters)    Stage 4 Severe CKD (GFR = 15-29 mL/min/1.73 square meters)    Stage 5 End Stage CKD (GFR <15 mL/min/1.73 square meters)  Note: GFR calculation is accurate only with a steady state creatinine    HS Troponin 0hr (reflex protocol) [[id number]]  (Normal) Collected: 01/31/25 0947    Lab Status: Final result Specimen: Blood from Arm, Left Updated: 01/31/25 1017     hs TnI 0hr <2 ng/L     CBC and differential [[id number]] Collected: 01/31/25 0947    Lab Status: Final result Specimen: Blood from Arm, Left Updated: 01/31/25 0956     WBC 5.48 Thousand/uL      RBC 4.50 Million/uL      Hemoglobin 14.5 g/dL      Hematocrit 42.5 %      MCV 94 fL      MCH 32.2 pg      MCHC 34.1 g/dL      RDW 13.3 %      MPV 9.1 fL      Platelets 234 Thousands/uL      nRBC 0 /100 WBCs      Segmented % 59 %      Immature Grans % 0 %      Lymphocytes % 30 %      Monocytes % 8 %      Eosinophils Relative 2 %      Basophils Relative 1 %      Absolute Neutrophils 3.23 Thousands/µL      Absolute Immature Grans 0.01 Thousand/uL      Absolute Lymphocytes 1.66 Thousands/µL       Absolute Monocytes 0.43 Thousand/µL      Eosinophils Absolute 0.09 Thousand/µL      Basophils Absolute 0.06 Thousands/µL             No orders to display       Procedures    ED Medication and Procedure Management   Prior to Admission Medications   Prescriptions Last Dose Informant Patient Reported? Taking?   Coenzyme Q10 (COQ-10 PO)  Self Yes No   Sig: CoQ-10   Multiple Vitamin (MULTIVITAMIN ADULT PO)  Self Yes No   Sig: Take 1 Dose by mouth   atorvastatin (LIPITOR) 10 mg tablet  Self Yes No   Sig: daily   folic acid (FOLVITE) 1 mg tablet  Self Yes No   Sig: Take 1 tablet by mouth every morning   methotrexate 2.5 MG tablet   No No   Sig: Take 6 tablets (15 mg total) by mouth once a week   metoprolol succinate (TOPROL-XL) 50 mg 24 hr tablet  Self No No   Sig: Take 1 tablet (50 mg total) by mouth 2 (two) times a day      Facility-Administered Medications: None     Patient's Medications   Discharge Prescriptions    No medications on file     No discharge procedures on file.  ED SEPSIS DOCUMENTATION          Disease Foundation guidelines for Chronic Kidney Disease (CKD):     Stage 1 with normal or high GFR (GFR > 90 mL/min/1.73 square meters)    Stage 2 Mild CKD (GFR = 60-89 mL/min/1.73 square meters)    Stage 3A Moderate CKD (GFR = 45-59 mL/min/1.73 square meters)    Stage 3B Moderate CKD (GFR = 30-44 mL/min/1.73 square meters)    Stage 4 Severe CKD (GFR = 15-29 mL/min/1.73 square meters)    Stage 5 End Stage CKD (GFR <15 mL/min/1.73 square meters)  Note: GFR calculation is accurate only with a steady state creatinine    HS Troponin 0hr (reflex protocol) [[id number]]  (Normal) Collected: 01/31/25 0947    Lab Status: Final result Specimen: Blood from Arm, Left Updated: 01/31/25 1017     hs TnI 0hr <2 ng/L     CBC and differential [[id number]] Collected: 01/31/25 0947    Lab Status: Final result Specimen: Blood from Arm, Left Updated: 01/31/25 0956     WBC 5.48 Thousand/uL      RBC 4.50 Million/uL      Hemoglobin 14.5 g/dL      Hematocrit 42.5 %      MCV 94 fL      MCH 32.2 pg      MCHC 34.1 g/dL      RDW 13.3 %      MPV 9.1 fL      Platelets 234 Thousands/uL      nRBC 0 /100 WBCs      Segmented % 59 %      Immature Grans % 0 %      Lymphocytes % 30 %      Monocytes % 8 %      Eosinophils Relative 2 %      Basophils Relative 1 %      Absolute Neutrophils 3.23 Thousands/µL      Absolute Immature Grans 0.01 Thousand/uL      Absolute Lymphocytes 1.66 Thousands/µL      Absolute Monocytes 0.43 Thousand/µL      Eosinophils Absolute 0.09 Thousand/µL      Basophils Absolute 0.06 Thousands/µL             No orders to display       Procedures    ED Medication and Procedure Management   Prior to Admission Medications   Prescriptions Last Dose Informant Patient Reported? Taking?   Coenzyme Q10 (COQ-10 PO)  Self Yes No   Sig: CoQ-10   Multiple Vitamin (MULTIVITAMIN ADULT PO)  Self Yes No   Sig: Take 1 Dose by mouth   atorvastatin (LIPITOR) 10 mg tablet  Self Yes No   Sig: daily   folic acid (FOLVITE) 1 mg tablet  Self Yes No   Sig:  Take 1 tablet by mouth every morning   methotrexate 2.5 MG tablet   No No   Sig: Take 6 tablets (15 mg total) by mouth once a week   metoprolol succinate (TOPROL-XL) 50 mg 24 hr tablet  Self No No   Sig: Take 1 tablet (50 mg total) by mouth 2 (two) times a day      Facility-Administered Medications: None     Discharge Medication List as of 1/31/2025  2:44 PM        CONTINUE these medications which have NOT CHANGED    Details   atorvastatin (LIPITOR) 10 mg tablet daily, Starting Mon 7/8/2019, Historical Med      Coenzyme Q10 (COQ-10 PO) CoQ-10, Historical Med      folic acid (FOLVITE) 1 mg tablet Take 1 tablet by mouth every morning, Historical Med      methotrexate 2.5 MG tablet Take 6 tablets (15 mg total) by mouth once a week, Starting Tue 1/14/2025, Until Sun 7/13/2025, Normal      metoprolol succinate (TOPROL-XL) 50 mg 24 hr tablet Take 1 tablet (50 mg total) by mouth 2 (two) times a day, Starting Mon 10/14/2024, Normal      Multiple Vitamin (MULTIVITAMIN ADULT PO) Take 1 Dose by mouth, Historical Med           No discharge procedures on file.  ED SEPSIS DOCUMENTATION   Time reflects when diagnosis was documented in both MDM as applicable and the Disposition within this note       Time User Action Codes Description Comment    1/31/2025  2:44 PM Joanna Hi [I48.91] Atrial fibrillation (HCC)     1/31/2025  2:44 PM Joanna Hi [R00.2] Palpitations                  Joanna Hi,   02/05/25 0930

## 2025-01-31 NOTE — ED ATTENDING ATTESTATION
1/31/2025  I, Mauricio Gillespie DO, saw and evaluated the patient. I have discussed the patient with the resident/non-physician practitioner and agree with the resident's/non-physician practitioner's findings, Plan of Care, and MDM as documented in the resident's/non-physician practitioner's note, except where noted. All available labs and Radiology studies were reviewed.  I was present for key portions of any procedure(s) performed by the resident/non-physician practitioner and I was immediately available to provide assistance.       At this point I agree with the current assessment done in the Emergency Department.  I have conducted an independent evaluation of this patient a history and physical is as follows:    Patient is a 69-year-old female with history of paroxysmal atrial fibrillation, hyperlipidemia, says that 6 PM last evening she felt herself go back into A-fib.  She says that since then she has had some feeling a little dyspnea on exertion, no dyspnea at rest, no chest pain, no palpitations.  She has a history of paroxysmal atrial fibrillation, has met with cardiology several times in the past, it is been recommended she be anticoagulated with a DOAC however she says due to her active lifestyle she has declined anticoagulation, and is currently on fish oil and 81 mg aspirin.  She says her cardiology recommended that whenever she is in paroxysmal A-fib to take a extra half of her metoprolol and normally she will cardiovert herself after about an hour or 2 hours, she took the extra dose yesterday evening, went to bed and woke up and was still in A-fib.  She has no excessive alcohol use recently, no excessive stimulants or caffeine usage.  She says that she will go in and out of A-fib occasionally.    General:  Patient is well-appearing  Head:  Atraumatic  Eyes:  Conjunctiva pink  ENT:  Mucous membranes are moist  Neck:  Supple  Cardiac:  S1-S2, without murmurs  Lungs:  Clear to auscultation  bilaterally  Abdomen:  Soft, nontender, normal bowel sounds, no CVA tenderness, no tympany, no rigidity, no guarding  Extremities:  Normal range of motion, no pedal edema or calf asymmetry, radial pulses are equal and symmetric bilaterally  Neurologic:  Awake, fluent speech, normal comprehension, AAOx3  Skin:  Pink warm and dry  Psychiatric:  Alert, pleasant, cooperative      ED Course  ED Course as of 01/31/25 1452   Fri Jan 31, 2025   1314 ECG performed at 0943 hrs., interpreted by me, atrial fibrillation, average ventricular rate of 81, no acute ischemic or infarctive changes, no prolonged rapid ventricular response, no significant change from August 11, 2024   1352 Patient reassessed, she thought that she may have gone back into sinus rhythm, repeat EKG interpreted me, sinus rhythm, rate of 69, no acute ischemic or infarctive changes.     Labs Reviewed   COMPREHENSIVE METABOLIC PANEL - Abnormal       Result Value Ref Range Status    Sodium 142  135 - 147 mmol/L Final    Potassium 4.4  3.5 - 5.3 mmol/L Final    Chloride 108  96 - 108 mmol/L Final    CO2 27  21 - 32 mmol/L Final    ANION GAP 7  4 - 13 mmol/L Final    BUN 16  5 - 25 mg/dL Final    Creatinine 0.82  0.60 - 1.30 mg/dL Final    Comment: Standardized to IDMS reference method    Glucose 100  65 - 140 mg/dL Final    Comment: If the patient is fasting, the ADA then defines impaired fasting glucose as > 100 mg/dL and diabetes as > or equal to 123 mg/dL.    Calcium 9.6  8.4 - 10.2 mg/dL Final    AST 18  13 - 39 U/L Final    ALT 29  7 - 52 U/L Final    Comment: Specimen collection should occur prior to Sulfasalazine administration due to the potential for falsely depressed results.     Alkaline Phosphatase 111 (*) 34 - 104 U/L Final    Total Protein 6.8  6.4 - 8.4 g/dL Final    Albumin 4.2  3.5 - 5.0 g/dL Final    Total Bilirubin 0.44  0.20 - 1.00 mg/dL Final    Comment: Use of this assay is not recommended for patients undergoing treatment with eltrombopag  "due to the potential for falsely elevated results.  N-acetyl-p-benzoquinone imine (metabolite of Acetaminophen) will generate erroneously low results in samples for patients that have taken an overdose of Acetaminophen.    eGFR 72  ml/min/1.73sq m Final    Narrative:     National Kidney Disease Foundation guidelines for Chronic Kidney Disease (CKD):     Stage 1 with normal or high GFR (GFR > 90 mL/min/1.73 square meters)    Stage 2 Mild CKD (GFR = 60-89 mL/min/1.73 square meters)    Stage 3A Moderate CKD (GFR = 45-59 mL/min/1.73 square meters)    Stage 3B Moderate CKD (GFR = 30-44 mL/min/1.73 square meters)    Stage 4 Severe CKD (GFR = 15-29 mL/min/1.73 square meters)    Stage 5 End Stage CKD (GFR <15 mL/min/1.73 square meters)  Note: GFR calculation is accurate only with a steady state creatinine   HS TROPONIN I 0HR - Normal    hs TnI 0hr <2  \"Refer to ACS Flowchart\"- see link ng/L Final    Comment:                                              Initial (time 0) result  If >=50 ng/L, Myocardial injury suggested ;  Type of myocardial injury and treatment strategy  to be determined.  If 5-49 ng/L, a delta result at 2 hours and or 4 hours will be needed to further evaluate.  If <4 ng/L, and chest pain has been >3 hours since onset, patient may qualify for discharge based on the HEART score in the ED.  If <5 ng/L and <3hours since onset of chest pain, a delta result at 2 hours will be needed to further evaluate.    HS Troponin 99th Percentile URL of a Health Population=12 ng/L with a 95% Confidence Interval of 8-18 ng/L.    Second Troponin (time 2 hours)  If calculated delta >= 20 ng/L,  Myocardial injury suggested ; Type of myocardial injury and treatment strategy to be determined.  If 5-49 ng/L and the calculated delta is 5-19 ng/L, consult medical service for evaluation.  Continue evaluation for ischemia on ecg and other possible etiology and repeat hs troponin at 4 hours.  If delta is <5 ng/L at 2 hours, consider " "discharge based on risk stratification via the HEART score (if in ED), or JEREMIAH risk score in IP/Observation.    HS Troponin 99th Percentile URL of a Health Population=12 ng/L with a 95% Confidence Interval of 8-18 ng/L.   TSH, 3RD GENERATION - Normal    TSH 3RD GENERATON 2.850  0.450 - 4.500 uIU/mL Final    Comment: The recommended reference ranges for TSH during pregnancy are as follows:   First trimester 0.100 to 2.500 uIU/mL   Second trimester  0.200 to 3.000 uIU/mL   Third trimester 0.300 to 3.000 uIU/m    Note: Normal ranges may not apply to patients who are transgender, non-binary, or whose legal sex, sex at birth, and gender identity differ.  Adult TSH (3rd generation) reference range follows the recommended guidelines of the [employer], January, 2020.   CBC AND DIFFERENTIAL    WBC 5.48  4.31 - 10.16 Thousand/uL Final    RBC 4.50  3.81 - 5.12 Million/uL Final    Hemoglobin 14.5  11.5 - 15.4 g/dL Final    Hematocrit 42.5  34.8 - 46.1 % Final    MCV 94  82 - 98 fL Final    MCH 32.2  26.8 - 34.3 pg Final    MCHC 34.1  31.4 - 37.4 g/dL Final    RDW 13.3  11.6 - 15.1 % Final    MPV 9.1  8.9 - 12.7 fL Final    Platelets 234  149 - 390 Thousands/uL Final    nRBC 0  /100 WBCs Final    Segmented % 59  43 - 75 % Final    Immature Grans % 0  0 - 2 % Final    Lymphocytes % 30  14 - 44 % Final    Monocytes % 8  4 - 12 % Final    Eosinophils Relative 2  0 - 6 % Final    Basophils Relative 1  0 - 1 % Final    Absolute Neutrophils 3.23  1.85 - 7.62 Thousands/µL Final    Absolute Immature Grans 0.01  0.00 - 0.20 Thousand/uL Final    Absolute Lymphocytes 1.66  0.60 - 4.47 Thousands/µL Final    Absolute Monocytes 0.43  0.17 - 1.22 Thousand/µL Final    Eosinophils Absolute 0.09  0.00 - 0.61 Thousand/µL Final    Basophils Absolute 0.06  0.00 - 0.10 Thousands/µL Final   HS TROPONIN I 2HR    hs TnI 2hr <2  \"Refer to ACS Flowchart\"- see link ng/L Final    Comment:                                        "       Initial (time 0) result  If >=50 ng/L, Myocardial injury suggested ;  Type of myocardial injury and treatment strategy  to be determined.  If 5-49 ng/L, a delta result at 2 hours and or 4 hours will be needed to further evaluate.  If <4 ng/L, and chest pain has been >3 hours since onset, patient may qualify for discharge based on the HEART score in the ED.  If <5 ng/L and <3hours since onset of chest pain, a delta result at 2 hours will be needed to further evaluate.    HS Troponin 99th Percentile URL of a Health Population=12 ng/L with a 95% Confidence Interval of 8-18 ng/L.    Second Troponin (time 2 hours)  If calculated delta >= 20 ng/L,  Myocardial injury suggested ; Type of myocardial injury and treatment strategy to be determined.  If 5-49 ng/L and the calculated delta is 5-19 ng/L, consult medical service for evaluation.  Continue evaluation for ischemia on ecg and other possible etiology and repeat hs troponin at 4 hours.  If delta is <5 ng/L at 2 hours, consider discharge based on risk stratification via the HEART score (if in ED), or JEREMIAH risk score in IP/Observation.    HS Troponin 99th Percentile URL of a Health Population=12 ng/L with a 95% Confidence Interval of 8-18 ng/L.    Delta 2hr hsTnI     Final    Comment: Unable to Calculate     Patient has had paroxysmal atrial fibrillation, since 6 PM last evening.  I discussed with patient about management options, risk and benefits including allowing to remain in A-fib with rate control, versus cardioversion.  Discussed risks of cardioversion including stroke and patient said that she was understanding of this risk and wanted to proceed with electrical cardioversion as soon as possible.  She said that she has never had reactions or problems with anesthesia or procedural sedation that she has received for other testing.    As noted above, patient continuously converted to sinus rhythm prior to cardioversion.  Patient reassessed, remained in sinus  rhythm.  She feels comfortable with discharge home with outpatient cardiology follow-up.  Discussed anticoagulated patient and she indicated she still declined anticoagulation    Patient has had recurrence of paroxysmal atrial fibrillation.  She has no evidence of ACS, no evidence of life-threatening metabolic abnormality.      DIAGNOSIS:  Acute recurrent paroxysmal atrial fibrillation, patient declining anticoagulation    MEDICAL DECISION MAKING CODING    COLLECTION AND INTERPRETATION OF DATA  I reviewed prior external notes, including December 3, 2024 cardiology office visit    I ordered each unique test  Tests reviewed personally by me:  ECG: See my ED course  Labs: See above            Critical Care Time  Procedures

## 2025-01-31 NOTE — TELEPHONE ENCOUNTER
Caller: Patient     Doctor: Dr. Cade wynne     Reason for call: Pt called & stated that she is in the ER for afib and was advised to call the office and advised Dr. Wynne. Pt also stated that an EKG & labs were done while at the ER and results are in chart. Pt is still waiting to be seen.     Call back#: [phone]

## 2025-03-12 ENCOUNTER — HOSPITAL ENCOUNTER (OUTPATIENT)
Dept: RADIOLOGY | Age: 70
Discharge: HOME | End: 2025-03-12
Payer: MEDICARE

## 2025-03-12 DIAGNOSIS — Z78.0 POST-MENOPAUSAL: ICD-10-CM

## 2025-03-12 PROCEDURE — 77080 DXA BONE DENSITY AXIAL: CPT

## 2025-03-25 ENCOUNTER — OFFICE VISIT (OUTPATIENT)
Age: 70
End: 2025-03-25
Payer: MEDICARE

## 2025-03-25 VITALS
WEIGHT: 132 LBS | BODY MASS INDEX: 21.21 KG/M2 | OXYGEN SATURATION: 96 % | SYSTOLIC BLOOD PRESSURE: 124 MMHG | DIASTOLIC BLOOD PRESSURE: 66 MMHG | HEIGHT: 66 IN | HEART RATE: 80 BPM

## 2025-03-25 DIAGNOSIS — M15.0 PRIMARY GENERALIZED (OSTEO)ARTHRITIS: ICD-10-CM

## 2025-03-25 DIAGNOSIS — M81.0 OSTEOPOROSIS OF FOREARM: ICD-10-CM

## 2025-03-25 DIAGNOSIS — M19.90 INFLAMMATORY ARTHRITIS: Primary | ICD-10-CM

## 2025-03-25 DIAGNOSIS — Z79.899 ENCOUNTER FOR LONG-TERM (CURRENT) USE OF MEDICATIONS: ICD-10-CM

## 2025-03-25 PROCEDURE — 99214 OFFICE O/P EST MOD 30 MIN: CPT | Performed by: PHYSICIAN ASSISTANT

## 2025-03-25 NOTE — ASSESSMENT & PLAN NOTE
Her inflammatory arthritis symptoms are well-controlled with methotrexate.  No signs of active inflammation or synovitis on exam today.  We will continue to monitor her response to treatment.  Labs as ordered to monitor for medication side effects and toxicity.  Follow-up in 4 months or sooner if needed.    Orders:    CBC and differential; Standing    Comprehensive metabolic panel; Standing    Sedimentation rate, automated; Standing    C-reactive protein; Standing

## 2025-03-25 NOTE — ASSESSMENT & PLAN NOTE
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  She has a low FRAX risk.  Her forearm score is likely due to her history of inflammatory/rheumatoid arthritis.  She has no history of adult fracture.  Will plan to monitor her DEXA scan every 2 years.

## 2025-03-25 NOTE — PROGRESS NOTES
Name: Angi Souza      : 1955      MRN: [id number]  Encounter Provider: Corrina Roque PA-C  Encounter Date: 3/25/2025   Encounter department: [facility] RHEUMATOLOGY [facility]  :  Assessment & Plan  Inflammatory arthritis  Her inflammatory arthritis symptoms are well-controlled with methotrexate.  No signs of active inflammation or synovitis on exam today.  We will continue to monitor her response to treatment.  Labs as ordered to monitor for medication side effects and toxicity.  Follow-up in 4 months or sooner if needed.    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  She has a low FRAX risk.  Her forearm score is likely due to her history of inflammatory/rheumatoid arthritis.  She has no history of adult fracture.  Will plan to monitor her DEXA scan every 2 years.         Primary generalized (osteo)arthritis  She had signs of osteoarthritis on her [facility] ultrasound in her hands and wrists.  Encouraged regular home exercise.        Encounter for long-term (current) use of medications    Orders:    CBC and differential; Standing    Comprehensive metabolic panel; Standing    Sedimentation rate, automated; Standing    C-reactive protein; Standing        History of Present Illness   Angi Souza is a 69 y.o. female.  She is here for follow up of inflammatory arthritis.  She is a previous patient of Dr. Funes.  She is currently being treated with oral methotrexate.  She is currently taking 10 mg once weekly.  She had a musculoskeletal ultrasound done of her bilateral hands and wrists on 1/10/2025.  This revealed synovial hypertrophy and hyperemia at the right radiocarpal/intercarpal joints consistent with active inflammatory arthritis.  She had synovial hypertrophy without hyperemia at the  right second MCP joint.  She was also noted to have scattered osteoarthritis throughout her DIP and PIP joints bilaterally.  We did discuss increasing her dose after her ultrasound came back however she discontinued her turmeric supplement and since then she has been doing well and the swelling she was experiencing in her hands has subsided.     She had a DEXA scan done on 3/12/2025.  Lumbar spine T-score (L1, L3, L4) -1.1.  Left total hip T-score -0.9, femoral neck -1.6.  Left forearm T-score -3.4.  FRAX hip fracture risk 1.4%, major fracture risk 9.8%.  She has no history of adult fracture.    She has a history of generalized osteoarthritis along with lumbar degenerative disc disease.  She has a history of a DVT.  She has a history of kidney stones.  She follows with cardiology for history of paroxysmal atrial fibrillation along with hyperlipidemia.      She had labs done on 3/18/2025.  CBC unremarkable.  Creatinine 0.68, GFR 94.  AST 22, ALT 31.  ESR, CRP within normal limits.  Rheumatoid factor, CCP negative.  Vitamin D 39.            Review of Systems   Constitutional:  Negative for chills, fatigue and fever.   HENT:  Negative for hearing loss, sore throat and tinnitus.    Eyes:  Negative for pain and visual disturbance.        Dry eyes   Respiratory:  Negative for cough and shortness of breath.    Cardiovascular:  Negative for chest pain and palpitations.   Gastrointestinal:  Negative for abdominal pain, nausea and vomiting.   Genitourinary:  Negative for difficulty urinating.   Musculoskeletal:  Positive for arthralgias and joint swelling. Negative for back pain, gait problem, myalgias, neck pain and neck stiffness.   Skin:  Negative for rash.   Neurological:  Negative for dizziness, seizures, weakness, numbness and headaches.   Psychiatric/Behavioral:  Negative for confusion, decreased concentration and sleep disturbance.      Current Outpatient Medications on File Prior to Visit   Medication Sig Dispense  "Refill    atorvastatin (LIPITOR) 10 mg tablet daily      Coenzyme Q10 (COQ-10 PO) CoQ-10      folic acid (FOLVITE) 1 mg tablet Take 1 tablet by mouth every morning      methotrexate 2.5 MG tablet Take 6 tablets (15 mg total) by mouth once a week 72 tablet 1    metoprolol succinate (TOPROL-XL) 50 mg 24 hr tablet Take 1 tablet (50 mg total) by mouth 2 (two) times a day 180 tablet 3    Multiple Vitamin (MULTIVITAMIN ADULT PO) Take 1 Dose by mouth       No current facility-administered medications on file prior to visit.         Objective   /66   Pulse 80   Ht 5' 6\" (1.676 m)   Wt 59.9 kg (132 lb)   SpO2 96%   BMI 21.31 kg/m²      Physical Exam  Constitutional:       Appearance: Normal appearance.   Cardiovascular:      Rate and Rhythm: Normal rate and regular rhythm.   Pulmonary:      Breath sounds: Normal breath sounds.   Musculoskeletal:         General: Swelling (Soft tissue prominence left index finger PIP and DIP joints) present. No tenderness.   Skin:     General: Skin is warm and dry.   Neurological:      General: No focal deficit present.      Mental Status: She is alert.             Dragon Dictation software was used to dictate this note. It may contain errors with dictating incorrect words/spelling. Please contact provider directly for any questions.    "

## 2025-03-25 NOTE — ASSESSMENT & PLAN NOTE
She had signs of osteoarthritis on her [facility] ultrasound in her hands and wrists.  Encouraged regular home exercise.

## 2025-03-31 ENCOUNTER — HOSPITAL ENCOUNTER (OUTPATIENT)
Dept: RADIOLOGY | Age: 70
Discharge: HOME | End: 2025-03-31
Payer: MEDICARE

## 2025-03-31 DIAGNOSIS — Z12.31 VISIT FOR SCREENING MAMMOGRAM: ICD-10-CM

## 2025-03-31 PROCEDURE — 77067 SCR MAMMO BI INCL CAD: CPT

## 2025-03-31 PROCEDURE — 77063 BREAST TOMOSYNTHESIS BI: CPT

## 2025-07-06 NOTE — PROGRESS NOTES
HEART AND VASCULAR  CARDIAC ELECTROPHYSIOLOGY   HEART RHYTHM CENTER  [facility]    Outpatient follow up for assessment and management of atrial fibrillation  Today's Date: 07/07/25    Patient name: Angi Souza  YOB: 1955  Sex: female       Chief Complaint: paroxymal atrial fibrillation      ASSESSMENT:  Problem List Items Addressed This Visit       Paroxysmal atrial fibrillation (HCC) - Primary    Relevant Orders    POCT ECG    Echo stress test, exercise    Hyperlipidemia    RESOLVED: Encounter for long-term (current) use of insulin (HCC)     Other Visit Diagnoses         Hypertension, unspecified type                      PLAN:  Paroxysmal atrial fibrillation/palpitations  -Patient declines anticoagulation  -Will continue to use aspirin 81 mg and fish oil as is her preference  -Understand stroke risk  -[id number] score is 2  -Continue metoprolol succinate 50 mg every 12 hours  -stress test ordered in preparation for flecainide  - If stress testing negative and starting flecainide, will start flecainide 100 mg twice daily and decrease metoprolol succinate to 25 mg daily  -She will consider ablation as a future intervention if flecainide is not successful    Hypertension  -Documented in EMR  - Has been normotensive at visits  -BP at today's visit  -removing from list    Diabetes  -in EMR as insulin dependent  -no insulin, no evidence of DM  -removing from list    Hyperlipidemia  -continue atorvastatin 10mg daily    Follow up in: 6-8 months    Orders Placed This Encounter   Procedures    POCT ECG     There are no discontinued medications.          .............................................................................................    HPI/Subjective:     Ms. Angi Souza is a 68 year old female with a history of hyperlipidemia and paroxysmal atrial fibrillation on metoprolol for rate control. She was prescribed apixaban for anticoagulation but declines to take this  medication due to history of vitreous and retinal hemorrhages. She was recent seen in the ER with complaints of palpitations. She was found to be in atrial fibrillation with a heart rate of 99bpm.  She was discharged with rate control and scheduled for evaluation by electrophysiology.     She was seen in outpatient electrophysiology clinic at which time she was in normal sinus rhythm.  She felt overall well denying chest pain, palpitations, shortness of, dizziness, lightheadedness, syncope, near syncope.  Her last episode of A-fib was 2 to 3 weeks prior to her appointment and lasted 3 to 4 hours.  She stated if she is going to get palpitations, they usually happen in the evening around the time her next dose of metoprolol scheduled.    We discussed management of atrial fibrillation.  We discussed rate control, rhythm control with antiarrhythmics, rhythm control with EP study and ablation.  We discussed all antiarrhythmics available including flecainide/propafenone, sotalol/dofetilide, amiodarone.  We discussed ablation including the need for anticoagulation for 3 months following ablation.    In addition, we discussed anticoagulation as a whole.  Her [id number] score is 2 giving her 2.9% risk of thrombotic event.  She noted that she currently takes aspirin and fish oil.  She would rather avoid anticoagulation if possible.  Her father had a major brain bleed leading to his death.  She notes that she herself has a history of vitreous and retinal hemorrhages.  She feels that her risk of stroke equals her risk of bleeding at this point.  We discussed that if it is her preference we could certainly continue to avoid anticoagulation as long as she understands the risks she is assuming.  We also discussed that should she undergo EP study and ablation, she would have to be on anticoagulation for 3 months as her risk of clotting would dramatically increase during a 3-month time period.  She notes that she is not averse to  taking anticoagulation for a limited period of time if she has an ablation.    In the end, the decision was made to proceed with rate control first.  We transitioned to metoprolol succinate 50 mg p.o. twice daily with the hopes of a longer acting agent will give her beta-blocker coverage throughout the day/night.  She had plans to travel to [facility] and [place], and the plan was to escalate to antiarrhythmic if she had recurrence during his travels.    Today she presents in follow-up.  She feels well.  She states that while in [place] she got norovirus requiring treatment with medication such as antidiarrheal medications and IV fluids.  During this time she did feel an episode of atrial fibrillation that lasted about 1 day.  She continues to have episodes of A-fib about every other week to every 3 weeks that are short and self-limited to which she attributes in some respect to dehydration or overhydration.  She states that it is scary and unnerving when she goes in atrial fibrillation.    We discussed and reviewed options for management.  We discussed ablation once more and the need for anticoagulation post ablation.  We discussed antiarrhythmics, we also discussed the Watchman procedure.    With regards to the watchman, we discussed that this does not control or alter her atrial fibrillation in any way.  This is primarily to have the ability to remove the need for anticoagulation.  Given her oral travels and activities such as horseback riding and others that could lead to injury and bleeding, a watchman would not be unreasonable.  She is unsure if she would like to proceed in that direction.    She has plans to travel to [facility] in September.  Given this, we will look for medication/antiarrhythmic management at this time.  I will order a stress test, and if this is negative, will start flecainide 100 mg twice daily.  If starting flecainide, I will reduce her metoprolol to 25 mg daily.  She will continue to  "consider procedural intervention.    Complete 12 point ROS reviewed and otherwise non pertinent or negative except as per HPI pertinent positives in Cardiovascular and Respiratory emphasized. Please see paper chart for outpatient clinic patients where the patient completed the 12 point ROS survey.           Past Medical History:   Diagnosis Date    Arthritis     Heart murmur     Hyperlipemia     Skin cancer     all 3 types       Allergies   Allergen Reactions    Other Other (See Comments)     macrofurodantin--pt gets violently ill    Medical Tape Other (See Comments)     \"skin burns\"    Nitrofurantoin Other (See Comments)     nitrofurantoin, macrocrystals    Sulfa Antibiotics Rash     I reviewed the Home Medication list and Allergies in the chart.   Scheduled Meds:  Current Outpatient Medications   Medication Sig Dispense Refill    atorvastatin (LIPITOR) 10 mg tablet in the morning.      cephalexin (KEFLEX) 500 mg capsule       clobetasol (TEMOVATE) 0.05 % cream APPLY TWICE DAILY TO RASH UP TO 2 WEEKS AS NEEDED.      Coenzyme Q10 (COQ-10 PO)       folic acid (FOLVITE) 1 mg tablet Take 1 tablet by mouth every morning      methotrexate 2.5 MG tablet Take 6 tablets (15 mg total) by mouth once a week 72 tablet 1    metoprolol succinate (TOPROL-XL) 50 mg 24 hr tablet Take 1 tablet (50 mg total) by mouth 2 (two) times a day 180 tablet 3    Multiple Vitamin (MULTIVITAMIN ADULT PO) Take 1 Dose by mouth       No current facility-administered medications for this visit.     PRN Meds:.        Family History   Problem Relation Name Age of Onset    No Known Problems Mother      No Known Problems Father      No Known Problems Daughter      No Known Problems Maternal Grandmother      Prostate cancer Maternal Grandfather  80    Leukemia Maternal Grandfather  80    No Known Problems Paternal Grandmother      No Known Problems Paternal Grandfather      No Known Problems Son      No Known Problems Maternal Aunt      No Known Problems " Maternal Aunt      No Known Problems Paternal Aunt      No Known Problems Paternal Aunt      Lymphoma Brother  35       Social History     Socioeconomic History    Marital status: /Civil Union     Spouse name: Not on file    Number of children: Not on file    Years of education: Not on file    Highest education level: Not on file   Occupational History    Not on file   Tobacco Use    Smoking status: Never    Smokeless tobacco: Never   Vaping Use    Vaping status: Never Used   Substance and Sexual Activity    Alcohol use: Yes     Alcohol/week: 1.0 standard drink of alcohol     Types: 1 Glasses of wine per week     Comment: occasionally    Drug use: Not Currently    Sexual activity: Not on file   Other Topics Concern    Not on file   Social History Narrative    Not on file     Social Drivers of Health     Financial Resource Strain: Low Risk  (11/25/2023)    Received from [facility] [address] [facility]    Overall Financial Resource Strain (CARDIA)     Difficulty of Paying Living Expenses: Not hard at all   Food Insecurity: No Food Insecurity (12/23/2023)    Received from [facility] [address]    Hunger Vital Sign     Within the past 12 months, you worried that your food would run out before you got the money to buy more.: Never true     Within the past 12 months, the food you bought just didn't last and you didn't have money to get more.: Never true   Transportation Needs: No Transportation Needs (11/25/2023)    Received from [facility] [address]    PRAPARE - Transportation     Lack of Transportation (Medical): No     Lack of Transportation (Non-Medical): No   Physical Activity: Not on file   Stress: Not on file   Social Connections: Not on file   Intimate Partner Violence: Not At Risk (11/25/2023)    Received from [facility] [address] [facility]    Humiliation, Afraid, Rape, and Kick questionnaire     Fear of Current or Ex-Partner: No     Emotionally Abused: No     Physically Abused: No     Sexually  "Abused: No   Housing Stability: Not on file         OBJECTIVE:    /60 (BP Location: Left arm, Patient Position: Sitting, Cuff Size: Standard)   Pulse 61   Ht 5' 6\" (1.676 m)   Wt 79.4 kg (175 lb)   BMI 28.25 kg/m²   Vitals:    07/07/25 0944   Weight: 79.4 kg (175 lb)         GEN: No acute distress, Alert and oriented, well appearing  HEENT:Normocephalic, atraumatic  CARDIOVASCULAR:  RRR, No murmur, rub, gallops S1,S2  LUNGS: Clear To auscultation bilaterally, normal effort, no rales, rhonchi, crackles   ABDOMEN:  nondistended,  without obvious organomegaly or ascites  EXTREMITIES/VASCULAR:  No edema. warm an well perfused.  PSYCH: Normal Affect,  linear speech pattern without evidence of psychosis.   NEURO: Grossly intact, moving all extremiteis equal, face symmetric, alert and responsive, no obvious focal defecits   GAIT:  Ambulates normally without difficulty  HEME: No bleeding, bruising, petechia, purpura   SKIN: No significant rashes on visibile skin, warm, no diaphoresis or pallor.     Lab Results:       LABS:      Chemistry        Component Value Date/Time    K 4.1 06/24/2025 1035    K 4.3 04/01/2024 1116     06/24/2025 1035     04/01/2024 1116    CO2 29 06/24/2025 1035    CO2 30 04/01/2024 1116    BUN 20 06/24/2025 1035    BUN 17 04/01/2024 1116    CREATININE 0.76 06/24/2025 1035    CREATININE 0.82 01/31/2025 0947    CREATININE 0.75 04/01/2024 1116        Component Value Date/Time    CALCIUM 9.7 06/24/2025 1035    CALCIUM 9.5 04/01/2024 1116    ALKPHOS 116 06/24/2025 1035    ALKPHOS 142 (H) 01/11/2024 0951    AST 18 06/24/2025 1035    AST 16 01/11/2024 0951    ALT 25 06/24/2025 1035    ALT 25 01/11/2024 0951            No results found for: \"CHOL\"  No results found for: \"HDL\"  No results found for: \"LDLCALC\"  No results found for: \"TRIG\"  No results found for: \"CHOLHDL\"    IMAGING: No results found.     Cardiac testing:   Current EKG performed at today's visit  and read by me personally " reveals normal sinus rhythm    AMB monitor 2/14/24  Patient had a min HR of 48 bpm, max HR of 185 bpm, and avg HR of 71 bpm. Predominant underlying rhythm was Sinus Rhythm. 1 run of Ventricular Tachycardia occurred lasting 11 beats with a max rate of 140 bpm (avg 124 bpm). 21 Supraventricular Tachycardia runs occurred, the run with the fastest interval lasting 9 beats with a max rate of 185 bpm, the longest lasting 12.8 secs with an avg rate of 120 bpm. Some episodes of Supraventricular Tachycardia may be possible Atrial Tachycardia with variable block. Isolated SVEs were rare (<1.0%), SVE Couplets were rare (<1.0%), and SVE Triplets were rare (<1.0%). Isolated VEs were rare (<1.0%), and no VE Couplets or VE Triplets were present.     ECHO  1/5/24  Left Ventricle Left ventricular cavity size is normal. Wall thickness is normal. The left ventricular ejection fraction is 60% by visual estimation. Systolic function is normal. Wall motion is normal. Unable to assess diastolic function due to atrial fibrillation.   Right Ventricle Right ventricular cavity size is normal. Systolic function is normal.   Left Atrium The atrium is normal in size.   Right Atrium The atrium is normal in size.   Aortic Valve The aortic valve is trileaflet. The leaflets are not thickened. The leaflets are not calcified. The leaflets exhibit normal mobility. There is no evidence of regurgitation. The aortic valve has no significant stenosis.   Mitral Valve The leaflets are not thickened. The leaflets are not calcified. The leaflets exhibit normal mobility. There is moderate annular calcification.  There is mild regurgitation. There is no evidence of stenosis.   Tricuspid Valve The tricuspid valve was not well visualized. The leaflets exhibit normal mobility. There is mild regurgitation. There is no evidence of stenosis. The right ventricular systolic pressure is mildly elevated. The estimated right ventricular systolic pressure is 35.00 mmHg.    Pulmonic Valve The pulmonic valve was not well visualized. The leaflets exhibit normal mobility. There is trace regurgitation. There is no evidence of stenosis.   Ascending Aorta The aortic root is normal in size. The ascending aorta is normal in size.   IVC/SVC The right atrial pressure is estimated at 15.0 mmHg. The inferior vena cava is dilated. Respirophasic changes were blunted (less than 50% variation).   Pericardium There is no pericardial effusion.

## 2025-07-07 ENCOUNTER — OFFICE VISIT (OUTPATIENT)
Dept: CARDIOLOGY CLINIC | Facility: CLINIC | Age: 70
End: 2025-07-07
Payer: MEDICARE

## 2025-07-07 VITALS
HEART RATE: 61 BPM | WEIGHT: 175 LBS | SYSTOLIC BLOOD PRESSURE: 120 MMHG | BODY MASS INDEX: 28.12 KG/M2 | DIASTOLIC BLOOD PRESSURE: 60 MMHG | HEIGHT: 66 IN

## 2025-07-07 DIAGNOSIS — Z79.4 ENCOUNTER FOR LONG-TERM (CURRENT) USE OF INSULIN (HCC): ICD-10-CM

## 2025-07-07 DIAGNOSIS — E78.5 HYPERLIPIDEMIA, UNSPECIFIED HYPERLIPIDEMIA TYPE: ICD-10-CM

## 2025-07-07 DIAGNOSIS — I10 HYPERTENSION, UNSPECIFIED TYPE: ICD-10-CM

## 2025-07-07 DIAGNOSIS — I48.0 PAROXYSMAL ATRIAL FIBRILLATION (HCC): Primary | ICD-10-CM

## 2025-07-07 LAB
ATRIAL RATE: 61 BPM
P AXIS: 68 DEGREES
PR INTERVAL: 154 MS
QRS AXIS: 74 DEGREES
QRSD INTERVAL: 94 MS
QT INTERVAL: 414 MS
QTC INTERVAL: 417 MS
T WAVE AXIS: 59 DEGREES
VENTRICULAR RATE: 61 BPM

## 2025-07-07 PROCEDURE — 99214 OFFICE O/P EST MOD 30 MIN: CPT | Performed by: STUDENT IN AN ORGANIZED HEALTH CARE EDUCATION/TRAINING PROGRAM

## 2025-07-07 PROCEDURE — 93000 ELECTROCARDIOGRAM COMPLETE: CPT | Performed by: STUDENT IN AN ORGANIZED HEALTH CARE EDUCATION/TRAINING PROGRAM

## 2025-07-07 RX ORDER — CLOBETASOL PROPIONATE 0.5 MG/G
CREAM TOPICAL
COMMUNITY
Start: 2025-04-22

## 2025-07-07 RX ORDER — CEPHALEXIN 500 MG/1
CAPSULE ORAL
COMMUNITY
Start: 2025-06-28

## 2025-07-11 DIAGNOSIS — M19.90 INFLAMMATORY ARTHRITIS: ICD-10-CM

## 2025-07-14 ENCOUNTER — HOSPITAL ENCOUNTER (OUTPATIENT)
Dept: NON INVASIVE DIAGNOSTICS | Facility: CLINIC | Age: 70
Discharge: HOME | End: 2025-07-14
Attending: STUDENT IN AN ORGANIZED HEALTH CARE EDUCATION/TRAINING PROGRAM
Payer: MEDICARE

## 2025-07-14 VITALS
HEIGHT: 66 IN | BODY MASS INDEX: 28.12 KG/M2 | HEART RATE: 86 BPM | DIASTOLIC BLOOD PRESSURE: 78 MMHG | SYSTOLIC BLOOD PRESSURE: 122 MMHG | OXYGEN SATURATION: 98 % | WEIGHT: 175 LBS

## 2025-07-14 DIAGNOSIS — I48.0 PAROXYSMAL ATRIAL FIBRILLATION (HCC): ICD-10-CM

## 2025-07-14 LAB
E WAVE DECELERATION TIME: 194 MS
E/A RATIO: 0.79
MAX HR PERCENT: 104 %
MAX HR: 157 BPM
MV E'TISSUE VEL-LAT: 9 CM/S
MV E'TISSUE VEL-SEP: 9 CM/S
MV PEAK A VEL: 0.61 M/S
MV PEAK E VEL: 48 CM/S
MV STENOSIS PRESSURE HALF TIME: 56 MS
MV VALVE AREA P 1/2 METHOD: 3.9
RATE PRESSURE PRODUCT: NORMAL
SL CV LV EF: 65
SL CV STRESS RECOVERY BP: NORMAL MMHG
SL CV STRESS RECOVERY HR: 100 BPM
SL CV STRESS RECOVERY O2 SAT: 98 %
SL CV STRESS STAGE REACHED: 2
STRESS ANGINA INDEX: 0
STRESS BASELINE BP: NORMAL MMHG
STRESS BASELINE HR: 86 BPM
STRESS O2 SAT REST: 98 %
STRESS PEAK HR: 155 BPM
STRESS POST ESTIMATED WORKLOAD: 7 METS
STRESS POST EXERCISE DUR MIN: 6 MIN
STRESS POST EXERCISE DUR SEC: 0 SEC
STRESS POST O2 SAT PEAK: 97 %
STRESS POST PEAK BP: 178 MMHG

## 2025-07-14 PROCEDURE — 93350 STRESS TTE ONLY: CPT | Performed by: INTERNAL MEDICINE

## 2025-07-14 PROCEDURE — 93350 STRESS TTE ONLY: CPT

## 2025-07-15 LAB
CHEST PAIN STATEMENT: NORMAL
MAX DIASTOLIC BP: 90 MMHG
MAX PREDICTED HEART RATE: 150 BPM
PROTOCOL NAME: NORMAL
STRESS POST EXERCISE DUR MIN: 6 MIN
STRESS POST EXERCISE DUR SEC: 1 SEC
STRESS POST PEAK HR: 157 BPM
STRESS POST PEAK SYSTOLIC BP: 180 MMHG
TARGET HR FORMULA: NORMAL
TEST INDICATION: NORMAL

## 2025-07-15 RX ORDER — METHOTREXATE 2.5 MG/1
15 TABLET ORAL WEEKLY
Qty: 72 TABLET | Refills: 1 | Status: SHIPPED | OUTPATIENT
Start: 2025-07-15 | End: 2026-01-11

## 2025-07-16 DIAGNOSIS — I48.0 PAROXYSMAL ATRIAL FIBRILLATION (HCC): ICD-10-CM

## 2025-07-16 RX ORDER — METOPROLOL SUCCINATE 25 MG/1
25 TABLET, EXTENDED RELEASE ORAL DAILY
Qty: 90 TABLET | Refills: 3 | Status: SHIPPED | OUTPATIENT
Start: 2025-07-16

## 2025-07-16 RX ORDER — METOPROLOL SUCCINATE 50 MG/1
25 TABLET, EXTENDED RELEASE ORAL DAILY
Qty: 180 TABLET | Refills: 3 | Status: SHIPPED | OUTPATIENT
Start: 2025-07-16 | End: 2025-07-16

## 2025-07-16 RX ORDER — FLECAINIDE ACETATE 100 MG/1
100 TABLET ORAL 2 TIMES DAILY
Qty: 60 TABLET | Refills: 6 | Status: SHIPPED | OUTPATIENT
Start: 2025-07-16